# Patient Record
Sex: MALE | Race: WHITE
[De-identification: names, ages, dates, MRNs, and addresses within clinical notes are randomized per-mention and may not be internally consistent; named-entity substitution may affect disease eponyms.]

---

## 2020-05-20 ENCOUNTER — HOSPITAL ENCOUNTER (INPATIENT)
Dept: HOSPITAL 41 - JD.ED | Age: 20
LOS: 4 days | Discharge: HOME | DRG: 139 | End: 2020-05-24
Attending: INTERNAL MEDICINE | Admitting: INTERNAL MEDICINE
Payer: COMMERCIAL

## 2020-05-20 DIAGNOSIS — J18.9: Primary | ICD-10-CM

## 2020-05-20 DIAGNOSIS — F32.9: ICD-10-CM

## 2020-05-20 DIAGNOSIS — Z20.828: ICD-10-CM

## 2020-05-20 DIAGNOSIS — E87.6: ICD-10-CM

## 2020-05-20 DIAGNOSIS — F41.9: ICD-10-CM

## 2020-05-20 DIAGNOSIS — Z87.891: ICD-10-CM

## 2020-05-20 DIAGNOSIS — J40: ICD-10-CM

## 2020-05-20 PROCEDURE — 8E0ZXY6 ISOLATION: ICD-10-PCS | Performed by: INTERNAL MEDICINE

## 2020-05-20 PROCEDURE — U0002 COVID-19 LAB TEST NON-CDC: HCPCS

## 2020-05-20 NOTE — CR
Chest: Portable view of the chest was obtained.

 

Comparison: No previous chest imaging.

 

Mild increased density within the right lung base is seen.  Lungs 

otherwise are clear.  Heart size and mediastinum are normal.  Bony 

structures are unremarkable.

 

Impression:

1.  Mild increased density within the right lung base either due to 

focal bronchitis or early pneumonia.  Etiology could be bacterial or 

viral.

2.  No additional abnormality is seen.

 

Diagnostic code #3

 

This report was dictated in MDT

## 2020-05-20 NOTE — EDM.PDOC
ED HPI GENERAL MEDICAL PROBLEM





- General


Chief Complaint: Respiratory Problem


Stated Complaint: COUGH SOB CHEST PRESSURE VOMITING


Time Seen by Provider: 05/20/20 20:04


Source of Information: Reports: Patient


History Limitations: Reports: No Limitations





- History of Present Illness


INITIAL COMMENTS - FREE TEXT/NARRATIVE: 





Patient is a 20-year-old male who presents to the emergency department with a 

one-week history of fever, chills, body aches, cough, nausea, and vomiting.  He 

states his symptoms initially began last Tuesday as a fever and body aches.  

Approximally 4 days ago he developed a dry cough, nausea, and vomiting.  He was 

seen on Monday at the [facility] walk-in clinic.  He was started on amoxicillin 

and azithromycin for pneumonia.  He was also given Zofran for nausea.  A 

coronavirus screen was collected that time, however he has not received results 

of this.  He states that he has not been able to keep the oral antibiotics down 

as he continues to vomit despite Zofran.  Today he developed shortness of 

breath.  He continues to have intermittent fevers, however he is unsure of his T

-max as he does not have a thermometer at home.  Patient did recently quit 

smoking and states that he was working outside" the wet and cold ".  He came to 

[place] from [place] on 7 May to work at the [employer] in [employer].  He states 

that prior to coming to [place] he was on quarantine as the entire state 

is on lockdown.  Once arriving to my door on the seventh he was on another 4 to 

5 days of quarantine until the executive orders were listed lifted by the 

governor.  His first day of work with a group of individuals was on the 12th 

which was only 1 day prior to him developing symptoms.  He has had no known 

sick contacts.  H denies any underlying health conditions.  


  ** Generalized


Pain Score (Numeric/FACES): 6





- Related Data


 Allergies











Allergy/AdvReac Type Severity Reaction Status Date / Time


 


No Known Allergies Allergy   Verified 05/20/20 23:37











Home Meds: 


 Home Meds





. [No Known Home Meds]  05/21/20 [History]











Past Medical History





- Past Health History


Medical/Surgical History: Denies Medical/Surgical History





Social & Family History





- Tobacco Use


Smoking Status *Q: Former Smoker


Used Tobacco, but Quit: Yes


Month/Year Tobacco Last Used: march 2020





ED ROS GENERAL





- Review of Systems


Review Of Systems: Comprehensive ROS is negative, except as noted in HPI.





ED EXAM, GENERAL





- Physical Exam


Exam: See Below


Exam Limited By: No Limitations


General Appearance: Alert, WD/WN, No Apparent Distress


Respiratory/Chest: No Respiratory Distress, Normal Breath Sounds, No Accessory 

Muscle Use, Chest Non-Tender, Decreased Breath Sounds.  No: Crackles, Rales, 

Rhonchi, Wheezing


Cardiovascular: Normal Peripheral Pulses, Regular Rate, Rhythm, No Edema, No 

Gallop, No JVD, No Murmur, No Rub


Neurological: Alert, Oriented, CN II-XII Intact, Normal Cognition, Normal Gait, 

Normal Reflexes, No Motor/Sensory Deficits


Psychiatric: Normal Affect, Normal Mood


Skin Exam: Warm, Dry, Intact, Normal Color, No Rash





Course





- Vital Signs


Last Recorded V/S: 


 Last Vital Signs











Temp  99.7 F   05/21/20 19:19


 


Pulse  103 H  05/21/20 17:45


 


Resp  24 H  05/21/20 15:15


 


BP  132/72   05/21/20 15:15


 


Pulse Ox  98   05/21/20 20:39














- Orders/Labs/Meds


Orders: 


 Active Orders 24 hr











 Category Date Time Status


 


 Patient Status [ADT] Routine ADT  05/20/20 23:13 Active








 Medication Orders





Acetaminophen (Tylenol)  650 mg PO Q4H PRN


   PRN Reason: Pain/Fever


   Last Admin: 05/21/20 18:08  Dose: 650 mg


   Admin: 05/21/20 08:15  Dose: 650 mg


   Admin: 05/21/20 00:24  Dose: 650 mg


Albuterol (Proventil Hfa)  0 gm INH Q2H PRN


   PRN Reason: Shortness Of Breath/wheezing


   Last Admin: 05/21/20 09:11  Dose: 2 puff


Albuterol (Proventil Hfa)  0 gm INH Q6H ELSY


   Last Admin: 05/21/20 20:35  Dose: 2 puff


   Admin: 05/21/20 14:40  Dose: 2 puff


   Admin: 05/21/20 09:11  Dose:  


Cyclobenzaprine HCl (Flexeril)  10 mg PO TID PRN


   PRN Reason: Muscle Spasm


   Last Admin: 05/21/20 17:43  Dose: 10 mg


Glycopyrrolate (Seebri Neohaler)  15.6 mcg IH BID ELSY


   Last Admin: 05/21/20 20:36  Dose: 1 puff


   Admin: 05/21/20 09:11  Dose: 1 puff


Guaifenesin/Phenylephrine HCl (Robitussin Dm)  10 ml PO TID@0700,1400,2100 PRN


   PRN Reason: Cough


Lactated Ringer's (Ringers, Lactated)  1,000 mls @ 50 mls/hr IV ASDIRECTED ELSY


   Last Admin: 05/21/20 15:15  Dose: 50 mls/hr


Ceftriaxone Sodium 2 gm/ (Sodium Chloride)  100 mls @ 200 mls/hr IV Q24H ELSY


   Last Admin: 05/21/20 08:16  Dose: 200 mls/hr


Azithromycin 500 mg/ Sodium (Chloride)  250 mls @ 250 mls/hr IV Q24H ELSY


   Last Admin: 05/21/20 09:27  Dose: 250 mls/hr


Metoclopramide HCl (Reglan)  5 mg IVPUSH Q6H PRN


   PRN Reason: Nausea


   Last Admin: 05/21/20 18:08  Dose: 5 mg


Miscellaneous Information (Remove Patch)  0 ea TRDERM ONETIME ONE


   Stop: 05/24/20 15:01


Ondansetron HCl (Zofran)  4 mg IVPUSH Q6HR PRN


   PRN Reason: Nausea


   Last Admin: 05/21/20 15:08  Dose: 4 mg


   Admin: 05/21/20 08:15  Dose: 4 mg


   Admin: 05/21/20 00:33  Dose: 4 mg


Sodium Chloride (Saline Flush)  10 ml FLUSH ASDIRECTED PRN


   PRN Reason: Keep Vein Open


   Last Admin: 05/20/20 20:42  Dose: 10 ml








Labs: 


 Laboratory Tests











  05/20/20 05/20/20 05/20/20 Range/Units





  20:35 20:35 20:35 


 


WBC  12.40 H    (4.23-9.07)  K/mm3


 


RBC  4.78    (4.63-6.08)  M/mm3


 


Hgb  13.3 L    (13.7-17.5)  gm/dl


 


Hct  39.7 L    (40.1-51.0)  %


 


MCV  83.1    (79.0-92.2)  fl


 


MCH  27.8    (25.7-32.2)  pg


 


MCHC  33.5    (32.2-35.5)  g/dl


 


RDW Std Deviation  37.4    (35.1-43.9)  fL


 


Plt Count  241    (163-337)  K/mm3


 


MPV  11.6    (9.4-12.3)  fl


 


Neut % (Auto)  92.4 H    (34.0-67.9)  %


 


Lymph % (Auto)  3.7 L    (21.8-53.1)  %


 


Mono % (Auto)  3.1 L    (5.3-12.2)  %


 


Eos % (Auto)  0.3 L    (0.8-7.0)  


 


Baso % (Auto)  0.2    (0.1-1.2)  %


 


Neut # (Auto)  11.45 H    (1.78-5.38)  K/mm3


 


Lymph # (Auto)  0.46 L    (1.32-3.57)  K/mm3


 


Mono # (Auto)  0.39    (0.30-0.82)  K/mm3


 


Eos # (Auto)  0.04    (0.04-0.54)  K/mm3


 


Baso # (Auto)  0.02    (0.01-0.08)  K/mm3


 


Manual Slide Review  Abnormal smear    


 


D-Dimer, Quantitative     (0.19-0.50)  mg/L


 


Puncture Site     


 


ABG pH     (7.35-7.45)  


 


ABG pCO2     (35.0-45.0)  mmHg


 


ABG pO2     (80.0-100.0)  mmHg


 


ABG HCO3     (22.0-26.0)  meq/L


 


ABG O2 Saturation     (96.0-97.0)  %


 


ABG Base Excess     (-2-2.0)  


 


James Test     


 


O2 Delivery Device     


 


FiO2     (21..00)  %


 


Sodium   137   (136-145)  mEq/L


 


Potassium   3.1 L   (3.5-5.1)  mEq/L


 


Chloride   98   ()  mEq/L


 


Carbon Dioxide   29   (21-32)  mEq/L


 


Anion Gap   13.1   (5-15)  


 


BUN   13   (7-18)  mg/dL


 


Creatinine   1.3   (0.7-1.3)  mg/dL


 


Est Cr Clr Drug Dosing   98.04   mL/min


 


Estimated GFR (MDRD)   > 60   (>60)  mL/min


 


BUN/Creatinine Ratio   10.0 L   (14-18)  


 


Glucose   127 H   ()  mg/dL


 


Lactic Acid    1.0  (0.4-2.0)  mmol/L


 


Calcium   9.1   (8.5-10.1)  mg/dL


 


Phosphorus     (2.6-4.7)  mg/dL


 


Magnesium     (1.8-2.4)  mg/dl


 


Ferritin     ()  ng/ml


 


Total Bilirubin   1.0   (0.2-1.0)  mg/dL


 


AST   47 H   (15-37)  U/L


 


ALT   42   (16-63)  U/L


 


Alkaline Phosphatase   72   ()  U/L


 


Lactate Dehydrogenase   456 H   ()  U/L


 


C-Reactive Protein   26.0 H*   (<1.0)  mg/dL


 


NT-Pro-B Natriuret Pep     (0-125)  pg/mL


 


Total Protein   7.3   (6.4-8.2)  g/dl


 


Albumin   2.9 L   (3.4-5.0)  g/dl


 


Globulin   4.4   gm/dL


 


Albumin/Globulin Ratio   0.7 L   (1-2)  


 


Procalcitonin     (<0.10)  ng/mL


 


SARS Virus RNA (PCR)     (NEGATIVE)  














  05/20/20 05/20/20 05/20/20 Range/Units





  20:35 20:35 20:35 


 


WBC     (4.23-9.07)  K/mm3


 


RBC     (4.63-6.08)  M/mm3


 


Hgb     (13.7-17.5)  gm/dl


 


Hct     (40.1-51.0)  %


 


MCV     (79.0-92.2)  fl


 


MCH     (25.7-32.2)  pg


 


MCHC     (32.2-35.5)  g/dl


 


RDW Std Deviation     (35.1-43.9)  fL


 


Plt Count     (163-337)  K/mm3


 


MPV     (9.4-12.3)  fl


 


Neut % (Auto)     (34.0-67.9)  %


 


Lymph % (Auto)     (21.8-53.1)  %


 


Mono % (Auto)     (5.3-12.2)  %


 


Eos % (Auto)     (0.8-7.0)  


 


Baso % (Auto)     (0.1-1.2)  %


 


Neut # (Auto)     (1.78-5.38)  K/mm3


 


Lymph # (Auto)     (1.32-3.57)  K/mm3


 


Mono # (Auto)     (0.30-0.82)  K/mm3


 


Eos # (Auto)     (0.04-0.54)  K/mm3


 


Baso # (Auto)     (0.01-0.08)  K/mm3


 


Manual Slide Review     


 


D-Dimer, Quantitative   0.86 H   (0.19-0.50)  mg/L


 


Puncture Site     


 


ABG pH     (7.35-7.45)  


 


ABG pCO2     (35.0-45.0)  mmHg


 


ABG pO2     (80.0-100.0)  mmHg


 


ABG HCO3     (22.0-26.0)  meq/L


 


ABG O2 Saturation     (96.0-97.0)  %


 


ABG Base Excess     (-2-2.0)  


 


James Test     


 


O2 Delivery Device     


 


FiO2     (21..00)  %


 


Sodium     (136-145)  mEq/L


 


Potassium     (3.5-5.1)  mEq/L


 


Chloride     ()  mEq/L


 


Carbon Dioxide     (21-32)  mEq/L


 


Anion Gap     (5-15)  


 


BUN     (7-18)  mg/dL


 


Creatinine     (0.7-1.3)  mg/dL


 


Est Cr Clr Drug Dosing     mL/min


 


Estimated GFR (MDRD)     (>60)  mL/min


 


BUN/Creatinine Ratio     (14-18)  


 


Glucose     ()  mg/dL


 


Lactic Acid     (0.4-2.0)  mmol/L


 


Calcium     (8.5-10.1)  mg/dL


 


Phosphorus     (2.6-4.7)  mg/dL


 


Magnesium     (1.8-2.4)  mg/dl


 


Ferritin  502 H    ()  ng/ml


 


Total Bilirubin     (0.2-1.0)  mg/dL


 


AST     (15-37)  U/L


 


ALT     (16-63)  U/L


 


Alkaline Phosphatase     ()  U/L


 


Lactate Dehydrogenase     ()  U/L


 


C-Reactive Protein     (<1.0)  mg/dL


 


NT-Pro-B Natriuret Pep     (0-125)  pg/mL


 


Total Protein     (6.4-8.2)  g/dl


 


Albumin     (3.4-5.0)  g/dl


 


Globulin     gm/dL


 


Albumin/Globulin Ratio     (1-2)  


 


Procalcitonin    0.82 H  (<0.10)  ng/mL


 


SARS Virus RNA (PCR)     (NEGATIVE)  














  05/20/20 05/20/20 05/20/20 Range/Units





  20:35 20:40 22:58 


 


WBC     (4.23-9.07)  K/mm3


 


RBC     (4.63-6.08)  M/mm3


 


Hgb     (13.7-17.5)  gm/dl


 


Hct     (40.1-51.0)  %


 


MCV     (79.0-92.2)  fl


 


MCH     (25.7-32.2)  pg


 


MCHC     (32.2-35.5)  g/dl


 


RDW Std Deviation     (35.1-43.9)  fL


 


Plt Count     (163-337)  K/mm3


 


MPV     (9.4-12.3)  fl


 


Neut % (Auto)     (34.0-67.9)  %


 


Lymph % (Auto)     (21.8-53.1)  %


 


Mono % (Auto)     (5.3-12.2)  %


 


Eos % (Auto)     (0.8-7.0)  


 


Baso % (Auto)     (0.1-1.2)  %


 


Neut # (Auto)     (1.78-5.38)  K/mm3


 


Lymph # (Auto)     (1.32-3.57)  K/mm3


 


Mono # (Auto)     (0.30-0.82)  K/mm3


 


Eos # (Auto)     (0.04-0.54)  K/mm3


 


Baso # (Auto)     (0.01-0.08)  K/mm3


 


Manual Slide Review     


 


D-Dimer, Quantitative     (0.19-0.50)  mg/L


 


Puncture Site    Rt radial  


 


ABG pH    7.48 H  (7.35-7.45)  


 


ABG pCO2    38.1  (35.0-45.0)  mmHg


 


ABG pO2    73.0 L  (80.0-100.0)  mmHg


 


ABG HCO3    27.7 H  (22.0-26.0)  meq/L


 


ABG O2 Saturation    96.1  (96.0-97.0)  %


 


ABG Base Excess    4.4 H  (-2-2.0)  


 


James Test    Positive  


 


O2 Delivery Device    Room air  


 


FiO2    0.00 L  (21..00)  %


 


Sodium     (136-145)  mEq/L


 


Potassium     (3.5-5.1)  mEq/L


 


Chloride     ()  mEq/L


 


Carbon Dioxide     (21-32)  mEq/L


 


Anion Gap     (5-15)  


 


BUN     (7-18)  mg/dL


 


Creatinine     (0.7-1.3)  mg/dL


 


Est Cr Clr Drug Dosing     mL/min


 


Estimated GFR (MDRD)     (>60)  mL/min


 


BUN/Creatinine Ratio     (14-18)  


 


Glucose     ()  mg/dL


 


Lactic Acid     (0.4-2.0)  mmol/L


 


Calcium     (8.5-10.1)  mg/dL


 


Phosphorus  2.4 L    (2.6-4.7)  mg/dL


 


Magnesium  1.8    (1.8-2.4)  mg/dl


 


Ferritin     ()  ng/ml


 


Total Bilirubin     (0.2-1.0)  mg/dL


 


AST     (15-37)  U/L


 


ALT     (16-63)  U/L


 


Alkaline Phosphatase     ()  U/L


 


Lactate Dehydrogenase     ()  U/L


 


C-Reactive Protein     (<1.0)  mg/dL


 


NT-Pro-B Natriuret Pep     (0-125)  pg/mL


 


Total Protein     (6.4-8.2)  g/dl


 


Albumin     (3.4-5.0)  g/dl


 


Globulin     gm/dL


 


Albumin/Globulin Ratio     (1-2)  


 


Procalcitonin     (<0.10)  ng/mL


 


SARS Virus RNA (PCR)   Negative   (NEGATIVE)  














  05/20/20 Range/Units





  23:15 


 


WBC   (4.23-9.07)  K/mm3


 


RBC   (4.63-6.08)  M/mm3


 


Hgb   (13.7-17.5)  gm/dl


 


Hct   (40.1-51.0)  %


 


MCV   (79.0-92.2)  fl


 


MCH   (25.7-32.2)  pg


 


MCHC   (32.2-35.5)  g/dl


 


RDW Std Deviation   (35.1-43.9)  fL


 


Plt Count   (163-337)  K/mm3


 


MPV   (9.4-12.3)  fl


 


Neut % (Auto)   (34.0-67.9)  %


 


Lymph % (Auto)   (21.8-53.1)  %


 


Mono % (Auto)   (5.3-12.2)  %


 


Eos % (Auto)   (0.8-7.0)  


 


Baso % (Auto)   (0.1-1.2)  %


 


Neut # (Auto)   (1.78-5.38)  K/mm3


 


Lymph # (Auto)   (1.32-3.57)  K/mm3


 


Mono # (Auto)   (0.30-0.82)  K/mm3


 


Eos # (Auto)   (0.04-0.54)  K/mm3


 


Baso # (Auto)   (0.01-0.08)  K/mm3


 


Manual Slide Review   


 


D-Dimer, Quantitative   (0.19-0.50)  mg/L


 


Puncture Site   


 


ABG pH   (7.35-7.45)  


 


ABG pCO2   (35.0-45.0)  mmHg


 


ABG pO2   (80.0-100.0)  mmHg


 


ABG HCO3   (22.0-26.0)  meq/L


 


ABG O2 Saturation   (96.0-97.0)  %


 


ABG Base Excess   (-2-2.0)  


 


James Test   


 


O2 Delivery Device   


 


FiO2   (21..00)  %


 


Sodium   (136-145)  mEq/L


 


Potassium   (3.5-5.1)  mEq/L


 


Chloride   ()  mEq/L


 


Carbon Dioxide   (21-32)  mEq/L


 


Anion Gap   (5-15)  


 


BUN   (7-18)  mg/dL


 


Creatinine   (0.7-1.3)  mg/dL


 


Est Cr Clr Drug Dosing   mL/min


 


Estimated GFR (MDRD)   (>60)  mL/min


 


BUN/Creatinine Ratio   (14-18)  


 


Glucose   ()  mg/dL


 


Lactic Acid   (0.4-2.0)  mmol/L


 


Calcium   (8.5-10.1)  mg/dL


 


Phosphorus   (2.6-4.7)  mg/dL


 


Magnesium   (1.8-2.4)  mg/dl


 


Ferritin   ()  ng/ml


 


Total Bilirubin   (0.2-1.0)  mg/dL


 


AST   (15-37)  U/L


 


ALT   (16-63)  U/L


 


Alkaline Phosphatase   ()  U/L


 


Lactate Dehydrogenase   ()  U/L


 


C-Reactive Protein   (<1.0)  mg/dL


 


NT-Pro-B Natriuret Pep  73  (0-125)  pg/mL


 


Total Protein   (6.4-8.2)  g/dl


 


Albumin   (3.4-5.0)  g/dl


 


Globulin   gm/dL


 


Albumin/Globulin Ratio   (1-2)  


 


Procalcitonin   (<0.10)  ng/mL


 


SARS Virus RNA (PCR)   (NEGATIVE)  











Meds: 


Medications











Generic Name Dose Route Start Last Admin





  Trade Name Freq  PRN Reason Stop Dose Admin


 


Acetaminophen  650 mg  05/20/20 23:45  05/21/20 18:08





  Tylenol  PO   650 mg





  Q4H PRN   Administration





  Pain/Fever   





     





     





     


 


Albuterol  0 gm  05/21/20 08:35  05/21/20 09:11





  Proventil Hfa  INH   2 puff





  Q2H PRN   Administration





  Shortness Of Breath/wheezing   





     





     





     


 


Albuterol  0 gm  05/21/20 09:00  05/21/20 20:35





  Proventil Hfa  INH   2 puff





  Q6H ELSY   Administration





     





     





     





     


 


Cyclobenzaprine HCl  10 mg  05/21/20 16:41  05/21/20 17:43





  Flexeril  PO   10 mg





  TID PRN   Administration





  Muscle Spasm   





     





     





     


 


Glycopyrrolate  15.6 mcg  05/21/20 09:00  05/21/20 20:36





  Seebri Neohaler  IH   1 puff





  BID ELSY   Administration





     





     





     





     


 


Guaifenesin/Phenylephrine HCl  10 ml  05/21/20 14:00  





  Robitussin Dm  PO   





  TID@0700,1400,2100 PRN   





  Cough   





     





     





     


 


Lactated Ringer's  1,000 mls @ 50 mls/hr  05/20/20 23:45  05/21/20 15:15





  Ringers, Lactated  IV   50 mls/hr





  ASDIRECTED ELSY   Administration





     





     





     





     


 


Ceftriaxone Sodium 2 gm/  100 mls @ 200 mls/hr  05/21/20 08:00  05/21/20 08:16





  Sodium Chloride  IV   200 mls/hr





  Q24H ELSY   Administration





     





     





     





     


 


Azithromycin 500 mg/ Sodium  250 mls @ 250 mls/hr  05/21/20 09:00  05/21/20 09:

27





  Chloride  IV   250 mls/hr





  Q24H ELSY   Administration





     





     





     





     


 


Metoclopramide HCl  5 mg  05/21/20 17:51  05/21/20 18:08





  Reglan  IVPUSH   5 mg





  Q6H PRN   Administration





  Nausea   





     





     





     


 


Miscellaneous Information  0 ea  05/24/20 15:00  





  Remove Patch  TRDERM  05/24/20 15:01  





  ONETIME ONE   





     





     





     





     


 


Ondansetron HCl  4 mg  05/21/20 00:13  05/21/20 15:08





  Zofran  IVPUSH   4 mg





  Q6HR PRN   Administration





  Nausea   





     





     





     


 


Sodium Chloride  10 ml  05/20/20 20:04  05/20/20 20:42





  Saline Flush  FLUSH   10 ml





  ASDIRECTED PRN   Administration





  Keep Vein Open   





     





     





     














Discontinued Medications














Generic Name Dose Route Start Last Admin





  Trade Name Haroldoq  PRN Reason Stop Dose Admin


 


Albuterol  0 gm  05/21/20 08:45  05/21/20 10:01





  Proventil Hfa  INH   Not Given





  Q6H ELSY   





     





     





     





     


 


Albuterol/Ipratropium  3 ml  05/21/20 09:00  





  Duoneb 3.0-0.5 Mg/3 Ml  NEB   





  Q6HRRT ELSY   





     





     





     





     


 


Ceftriaxone Sodium 1 gm/  100 mls @ 200 mls/hr  05/20/20 22:33  05/20/20 22:51





  Sodium Chloride  IV  05/20/20 23:02  200 mls/hr





  ONETIME ONE   Administration





     





     





     





     


 


Lactated Ringer's  1,000 mls @ 100 mls/hr  05/20/20 22:45  05/20/20 22:52





  Ringers, Lactated  IV   100 mls/hr





  ASDIRECTED ELSY   Administration





     





     





     





     


 


Potassium Chloride 10 meq/  100 mls @ 100 mls/hr  05/20/20 22:45  05/20/20 22:52





  Premix  IV  05/23/20 23:44  100 mls/hr





  ASDIRECTED ELSY   Administration





     





     





     





     


 


Potassium Chloride 10 meq/  100 mls @ 100 mls/hr  05/20/20 23:45  05/21/20 02:48





  Premix  IV  05/21/20 02:44  100 mls/hr





  Q1H ELSY   Administration





     





     





     





     


 


Scopolamine  1.5 mg  05/21/20 15:00  05/21/20 15:08





  Transderm-Scop  TOP  05/21/20 15:01  1.5 mg





  ONETIME ONE   Administration





     





     





     





     














- Re-Assessments/Exams


Free Text/Narrative Re-Assessment/Exam: 





05/20/20 23:08


Hematology was significant for a white count slightly elevated at 12.4, 

hemoglobin slightly low at 13.3, d-dimer elevated at 0.86, potassium low at 3.1

, ferritin high at 502, AST slightly elevated at 47, LDH high at 456, CRP 

elevated at 26.  Results of his labs are concerning for COVID, however the 

coronavirus test was found to be negative.  Chest x-ray shows mild increased 

density within the right lung base either due to focal bronchitis or early 

pneumonia.  Etiology could be bacterial or viral.  Patient has been on 2 L of 

O2 since his arrival to the ER satting 94 to 96%.  Vital signs were otherwise 

stable.  I have ordered infusion of lactated Ringer's at 100 mils per hour, 

Rocephin 1 g IV, and KCl 10 mEq x 4 bags.  Called and spoke with hospitalist, 

Dr. Cano.  She requested we add magnesium, phosphorus, and proBNP.  She 

gave verbal bridge orders for admission as an inpatient for pneumonia.











Departure





- Departure


Time of Disposition: 23:08


Disposition: Admitted As Inpatient 65


Condition: Good


Clinical Impression: 


Pneumonia


Qualifiers:


 Pneumonia type: due to unspecified organism Laterality: right Lung location: 

lower lobe of lung Qualified Code(s): J18.9 - Pneumonia, unspecified organism








- Discharge Information





Sepsis Event Note





- Evaluation


Sepsis Screening Result: Possible Sepsis Risk





- Focused Exam


Date Exam was Performed: 05/21/20


Time Exam was Performed: 22:03





- My Orders


Last 24 Hours: 


My Active Orders





05/20/20 23:13


Patient Status [ADT] Routine 














- Assessment/Plan


Last 24 Hours: 


My Active Orders





05/20/20 23:13


Patient Status [ADT] Routine

## 2020-05-21 RX ADMIN — GLYCOPYRROLATE SCH PUFF: 15.6 CAPSULE RESPIRATORY (INHALATION) at 20:36

## 2020-05-21 RX ADMIN — SODIUM CHLORIDE PRN MG: 9 INJECTION, SOLUTION INTRAVENOUS at 08:15

## 2020-05-21 RX ADMIN — SODIUM CHLORIDE PRN MG: 9 INJECTION, SOLUTION INTRAVENOUS at 00:33

## 2020-05-21 RX ADMIN — GLYCOPYRROLATE SCH PUFF: 15.6 CAPSULE RESPIRATORY (INHALATION) at 09:11

## 2020-05-21 RX ADMIN — SODIUM CHLORIDE PRN MG: 9 INJECTION, SOLUTION INTRAVENOUS at 15:08

## 2020-05-21 NOTE — PCM.HP.2
H&P History of Present Illness





- General


Date of Service: 05/21/20


Admit Problem/Dx: 


 Admission Diagnosis/Problem





Admission Diagnosis/Problem      Pneumonia








Source of Information: Patient, Old Records, Provider, RN, RN Notes Reviewed


History Limitations: Reports: No Limitations





- History of Present Illness


Initial Comments - Free Text/Narative: 


Juan Luis Stroud is a 19 yo male who presents to ED on 5/20/2020 with respiratory 

symptoms of cough, shortness of breath, chest pressure, and vomiting.  He 

reports fever, chills, body aches, cough, nausea, and vomiting for the past 

week.  Symptoms started on Tuesday, 9/12/2020 with fever and body aches.  These 

progressed to dry cough, nausea, and vomiting around 5/16/2020.  He presented 

to the [place] walk-in on Monday and was started on amoxicillin and 

azithromycin for pneumonia.  He was also given Zofran for nausea and a 

coronavirus screen was obtained.  States that he continues to vomit despite 

Zofran and has been unable to keep any of his antibiotics down.  He states 

prior to coming to the emergency room he developed shortness of breath.  He has 

had intermittent fevers but he is unsure how high they have been as he does not 

have a thermometer at home.  He states he recently quit smoking. He came to 

[place] from [place] on May 7 to work at the [employer].  He reports 

prior to coming in [place] he was quarantined due to the COVID 19 

lockdown.  He reports he was quarantined for another for 5 days once he arrived 

in [place].  He reports his first day of work was the 12th which was 1 day 

prior to developing symptoms.  Denies any prior sick contacts or underlying 

health conditions.





In the ED had a temp of 100.4 and a pulse of 106.  White count was elevated 

along with CRP.  Ferritin was elevated and d-dimer was mildly elevated.  COVID-

19 screen was obtained and was negative.  His potassium was also noted to be 

low.  He was hypoxic and placed on 2 L of oxygen with saturations of 94 to 96%.

  Ex x-rays obtained and shows a mild increased density within the right lung 

base either due to focal bronchitis or early pneumonia.  Etiology could be 

either bacterial or viral.





He does not have a primary care provider locally.  He was subsequently admitted 

to the medical floor for management of his failed outpatient pneumonia and 

hypoxia. He is a former smoker who quit in March of 2020. He states he vaped 

for about a year prior to quiting smoking as he was attempting to wean down. He 

states he also smoked marijuana. 





  ** Generalized


Pain Score (Numeric/FACES): 6





- Related Data


Allergies/Adverse Reactions: 


 Allergies











Allergy/AdvReac Type Severity Reaction Status Date / Time


 


No Known Allergies Allergy   Verified 05/20/20 23:37











Home Medications: 


 Home Meds





. [No Known Home Meds]  05/21/20 [History]











Past Medical History





- Past Health History


Medical/Surgical History: Denies Medical/Surgical History


HEENT History: Reports: None


Musculoskeletal History: Reports: Other (See Below)


Other Musculoskeletal History: football injuries to back cracked L4, but no 

surgeries.  reports chronic backpain however.


Neurological History: Reports: Concussion, Migraines


Psychiatric History: Reports: Anxiety, Depression, Panic Attack, Suicide Attempt

, Other (See Below)


Other Psychiatric History: reports general and social anxiety.  States his 

suicide attemt was once over 10 years ago and denies any current thoughts of 

harming self.


Endocrine/Metabolic History: Reports: Other (See Below)


Other Endocrine/Metabolic History: states he was told he was pre-diabetic so 

had checked his blood sugars at home for awhile but says they were always fine 

so he stopped.





- Infectious Disease History


Infectious Disease History: Reports: Influenza





- Past Surgical History


HEENT Surgical History: Reports: None


Endocrine Surgical History: Reports: None


Neurological Surgical History: Reports: Other (See Below)


Other Neurological Surgeries/Procedures: cracked L4 several years ago  but no 

surgeries.  now has some chronic backpain.


Musculoskeletal Surgical History: Reports: None





Social & Family History





- Tobacco Use


Smoking Status *Q: Former Smoker


Years of Tobacco use: 5


Packs/Tins Daily: 1


Used Tobacco, but Quit: Yes


Month/Year Tobacco Last Used: 5/7/2020


Second Hand Smoke Exposure: No





- Caffeine Use


Caffeine Use: Reports: None





- Recreational Drug Use


Recreational Drug Use: Yes


Drug Use in Last 12 Months: Yes


Recreational Drug Type: Reports: Marijuana/Hashish


Recreational Drug Use Frequency: Weekly





H&P Review of Systems





- Review of Systems:


Review Of Systems: See Below


General: Reports: Fever, Chills, Malaise, Weakness, Fatigue


HEENT: Reports: No Symptoms.  Denies: Headaches, Sore Throat


Pulmonary: Reports: Shortness of Breath, Cough, Sputum.  Denies: Wheezing, 

Pleuritic Chest Pain


Cardiovascular: Reports: Dyspnea on Exertion.  Denies: Chest Pain, Palpitations

, Edema


Gastrointestinal: Reports: Diarrhea, Nausea.  Denies: Abdominal Pain, 

Constipation, Vomiting (none since in hospital )


Genitourinary: Reports: No Symptoms.  Denies: Pain


Musculoskeletal: Reports: No Symptoms


Skin: Reports: No Symptoms.  Denies: Cyanosis


Psychiatric: Reports: No Symptoms.  Denies: Confusion


Neurological: Reports: No Symptoms.  Denies: Difficulty Walking, Gait 

Disturbance


Hematologic/Lymphatic: Reports: No Symptoms


Immunologic: Reports: No Symptoms





Exam





- Exam


Exam: See Below





- Vital Signs


Vital Signs: 


 Last Vital Signs











Temp  99.7 F   05/21/20 01:27


 


Pulse  88   05/20/20 23:35


 


Resp  18   05/20/20 23:30


 


BP  125/63   05/20/20 23:30


 


Pulse Ox  94 L  05/20/20 23:35











Weight: 174 lb 3.2 oz





- Exam


Quality Assessment: Supplemental Oxygen (2L)


General: Alert, Oriented, Cooperative.  No: Mild Distress


HEENT: Conjunctiva Clear, EOMI, Mucosa Moist & Pink, Posterior Pharynx Clear, 

PERRLA


Lungs: Normal Respiratory Effort, Decreased Breath Sounds, Crackles.  No: 

Rhonchi, Wheezing


Cardiovascular: Regular Rhythm, Tachycardia


GI/Abdominal Exam: Normal Bowel Sounds, Soft, Non-Tender, No Distention


 (Male) Exam: Deferred


Rectal (Males) Exam: Deferred


Back Exam: Normal Inspection, Full Range of Motion


Extremities: Normal Inspection, Normal Range of Motion, Non-Tender, No Pedal 

Edema, Normal Capillary Refill


Peripheral Pulses: 4+: Radial (L), Radial (R), Dorsalis Pedis (L), Dorsalis 

Pedis (R)


Skin: Warm, Dry, Intact


Neurological: Cranial Nerves Intact (Grossly )


Neuro Extensive - Mental Status: Alert, Oriented x3, Normal Mood/Affect





- Patient Data


Lab Results Last 24 hrs: 


 Laboratory Results - last 24 hr











  05/20/20 05/20/20 05/20/20 Range/Units





  20:35 20:35 20:35 


 


WBC  12.40 H    (4.23-9.07)  K/mm3


 


RBC  4.78    (4.63-6.08)  M/mm3


 


Hgb  13.3 L    (13.7-17.5)  gm/dl


 


Hct  39.7 L    (40.1-51.0)  %


 


MCV  83.1    (79.0-92.2)  fl


 


MCH  27.8    (25.7-32.2)  pg


 


MCHC  33.5    (32.2-35.5)  g/dl


 


RDW Std Deviation  37.4    (35.1-43.9)  fL


 


Plt Count  241    (163-337)  K/mm3


 


MPV  11.6    (9.4-12.3)  fl


 


Neut % (Auto)  92.4 H    (34.0-67.9)  %


 


Lymph % (Auto)  3.7 L    (21.8-53.1)  %


 


Mono % (Auto)  3.1 L    (5.3-12.2)  %


 


Eos % (Auto)  0.3 L    (0.8-7.0)  


 


Baso % (Auto)  0.2    (0.1-1.2)  %


 


Neut # (Auto)  11.45 H    (1.78-5.38)  K/mm3


 


Lymph # (Auto)  0.46 L    (1.32-3.57)  K/mm3


 


Mono # (Auto)  0.39    (0.30-0.82)  K/mm3


 


Eos # (Auto)  0.04    (0.04-0.54)  K/mm3


 


Baso # (Auto)  0.02    (0.01-0.08)  K/mm3


 


Manual Slide Review  Abnormal smear    


 


D-Dimer, Quantitative     (0.19-0.50)  mg/L


 


Puncture Site     


 


ABG pH     (7.35-7.45)  


 


ABG pCO2     (35.0-45.0)  mmHg


 


ABG pO2     (80.0-100.0)  mmHg


 


ABG HCO3     (22.0-26.0)  meq/L


 


ABG O2 Saturation     (96.0-97.0)  %


 


ABG Base Excess     (-2-2.0)  


 


James Test     


 


O2 Delivery Device     


 


FiO2     (21..00)  %


 


Sodium   137   (136-145)  mEq/L


 


Potassium   3.1 L   (3.5-5.1)  mEq/L


 


Chloride   98   ()  mEq/L


 


Carbon Dioxide   29   (21-32)  mEq/L


 


Anion Gap   13.1   (5-15)  


 


BUN   13   (7-18)  mg/dL


 


Creatinine   1.3   (0.7-1.3)  mg/dL


 


Est Cr Clr Drug Dosing   98.04   mL/min


 


Estimated GFR (MDRD)   > 60   (>60)  mL/min


 


BUN/Creatinine Ratio   10.0 L   (14-18)  


 


Glucose   127 H   ()  mg/dL


 


Lactic Acid    1.0  (0.4-2.0)  mmol/L


 


Calcium   9.1   (8.5-10.1)  mg/dL


 


Phosphorus     (2.6-4.7)  mg/dL


 


Magnesium     (1.8-2.4)  mg/dl


 


Ferritin     ()  ng/ml


 


Total Bilirubin   1.0   (0.2-1.0)  mg/dL


 


AST   47 H   (15-37)  U/L


 


ALT   42   (16-63)  U/L


 


Alkaline Phosphatase   72   ()  U/L


 


Lactate Dehydrogenase   456 H   ()  U/L


 


C-Reactive Protein   26.0 H*   (<1.0)  mg/dL


 


NT-Pro-B Natriuret Pep     (0-125)  pg/mL


 


Total Protein   7.3   (6.4-8.2)  g/dl


 


Albumin   2.9 L   (3.4-5.0)  g/dl


 


Globulin   4.4   gm/dL


 


Albumin/Globulin Ratio   0.7 L   (1-2)  


 


SARS Virus RNA (PCR)     (NEGATIVE)  














  05/20/20 05/20/20 05/20/20 Range/Units





  20:35 20:35 20:35 


 


WBC     (4.23-9.07)  K/mm3


 


RBC     (4.63-6.08)  M/mm3


 


Hgb     (13.7-17.5)  gm/dl


 


Hct     (40.1-51.0)  %


 


MCV     (79.0-92.2)  fl


 


MCH     (25.7-32.2)  pg


 


MCHC     (32.2-35.5)  g/dl


 


RDW Std Deviation     (35.1-43.9)  fL


 


Plt Count     (163-337)  K/mm3


 


MPV     (9.4-12.3)  fl


 


Neut % (Auto)     (34.0-67.9)  %


 


Lymph % (Auto)     (21.8-53.1)  %


 


Mono % (Auto)     (5.3-12.2)  %


 


Eos % (Auto)     (0.8-7.0)  


 


Baso % (Auto)     (0.1-1.2)  %


 


Neut # (Auto)     (1.78-5.38)  K/mm3


 


Lymph # (Auto)     (1.32-3.57)  K/mm3


 


Mono # (Auto)     (0.30-0.82)  K/mm3


 


Eos # (Auto)     (0.04-0.54)  K/mm3


 


Baso # (Auto)     (0.01-0.08)  K/mm3


 


Manual Slide Review     


 


D-Dimer, Quantitative   0.86 H   (0.19-0.50)  mg/L


 


Puncture Site     


 


ABG pH     (7.35-7.45)  


 


ABG pCO2     (35.0-45.0)  mmHg


 


ABG pO2     (80.0-100.0)  mmHg


 


ABG HCO3     (22.0-26.0)  meq/L


 


ABG O2 Saturation     (96.0-97.0)  %


 


ABG Base Excess     (-2-2.0)  


 


James Test     


 


O2 Delivery Device     


 


FiO2     (21..00)  %


 


Sodium     (136-145)  mEq/L


 


Potassium     (3.5-5.1)  mEq/L


 


Chloride     ()  mEq/L


 


Carbon Dioxide     (21-32)  mEq/L


 


Anion Gap     (5-15)  


 


BUN     (7-18)  mg/dL


 


Creatinine     (0.7-1.3)  mg/dL


 


Est Cr Clr Drug Dosing     mL/min


 


Estimated GFR (MDRD)     (>60)  mL/min


 


BUN/Creatinine Ratio     (14-18)  


 


Glucose     ()  mg/dL


 


Lactic Acid     (0.4-2.0)  mmol/L


 


Calcium     (8.5-10.1)  mg/dL


 


Phosphorus    2.4 L  (2.6-4.7)  mg/dL


 


Magnesium    1.8  (1.8-2.4)  mg/dl


 


Ferritin  502 H    ()  ng/ml


 


Total Bilirubin     (0.2-1.0)  mg/dL


 


AST     (15-37)  U/L


 


ALT     (16-63)  U/L


 


Alkaline Phosphatase     ()  U/L


 


Lactate Dehydrogenase     ()  U/L


 


C-Reactive Protein     (<1.0)  mg/dL


 


NT-Pro-B Natriuret Pep     (0-125)  pg/mL


 


Total Protein     (6.4-8.2)  g/dl


 


Albumin     (3.4-5.0)  g/dl


 


Globulin     gm/dL


 


Albumin/Globulin Ratio     (1-2)  


 


SARS Virus RNA (PCR)     (NEGATIVE)  














  05/20/20 05/20/20 05/20/20 Range/Units





  20:40 22:58 23:15 


 


WBC     (4.23-9.07)  K/mm3


 


RBC     (4.63-6.08)  M/mm3


 


Hgb     (13.7-17.5)  gm/dl


 


Hct     (40.1-51.0)  %


 


MCV     (79.0-92.2)  fl


 


MCH     (25.7-32.2)  pg


 


MCHC     (32.2-35.5)  g/dl


 


RDW Std Deviation     (35.1-43.9)  fL


 


Plt Count     (163-337)  K/mm3


 


MPV     (9.4-12.3)  fl


 


Neut % (Auto)     (34.0-67.9)  %


 


Lymph % (Auto)     (21.8-53.1)  %


 


Mono % (Auto)     (5.3-12.2)  %


 


Eos % (Auto)     (0.8-7.0)  


 


Baso % (Auto)     (0.1-1.2)  %


 


Neut # (Auto)     (1.78-5.38)  K/mm3


 


Lymph # (Auto)     (1.32-3.57)  K/mm3


 


Mono # (Auto)     (0.30-0.82)  K/mm3


 


Eos # (Auto)     (0.04-0.54)  K/mm3


 


Baso # (Auto)     (0.01-0.08)  K/mm3


 


Manual Slide Review     


 


D-Dimer, Quantitative     (0.19-0.50)  mg/L


 


Puncture Site   Rt radial   


 


ABG pH   7.48 H   (7.35-7.45)  


 


ABG pCO2   38.1   (35.0-45.0)  mmHg


 


ABG pO2   73.0 L   (80.0-100.0)  mmHg


 


ABG HCO3   27.7 H   (22.0-26.0)  meq/L


 


ABG O2 Saturation   96.1   (96.0-97.0)  %


 


ABG Base Excess   4.4 H   (-2-2.0)  


 


James Test   Positive   


 


O2 Delivery Device   Room air   


 


FiO2   0.00 L   (21..00)  %


 


Sodium     (136-145)  mEq/L


 


Potassium     (3.5-5.1)  mEq/L


 


Chloride     ()  mEq/L


 


Carbon Dioxide     (21-32)  mEq/L


 


Anion Gap     (5-15)  


 


BUN     (7-18)  mg/dL


 


Creatinine     (0.7-1.3)  mg/dL


 


Est Cr Clr Drug Dosing     mL/min


 


Estimated GFR (MDRD)     (>60)  mL/min


 


BUN/Creatinine Ratio     (14-18)  


 


Glucose     ()  mg/dL


 


Lactic Acid     (0.4-2.0)  mmol/L


 


Calcium     (8.5-10.1)  mg/dL


 


Phosphorus     (2.6-4.7)  mg/dL


 


Magnesium     (1.8-2.4)  mg/dl


 


Ferritin     ()  ng/ml


 


Total Bilirubin     (0.2-1.0)  mg/dL


 


AST     (15-37)  U/L


 


ALT     (16-63)  U/L


 


Alkaline Phosphatase     ()  U/L


 


Lactate Dehydrogenase     ()  U/L


 


C-Reactive Protein     (<1.0)  mg/dL


 


NT-Pro-B Natriuret Pep    73  (0-125)  pg/mL


 


Total Protein     (6.4-8.2)  g/dl


 


Albumin     (3.4-5.0)  g/dl


 


Globulin     gm/dL


 


Albumin/Globulin Ratio     (1-2)  


 


SARS Virus RNA (PCR)  Negative    (NEGATIVE)  














  05/21/20 05/21/20 05/21/20 Range/Units





  05:09 05:09 05:09 


 


WBC  9.68 H    (4.23-9.07)  K/mm3


 


RBC  4.70    (4.63-6.08)  M/mm3


 


Hgb  13.2 L    (13.7-17.5)  gm/dl


 


Hct  39.5 L    (40.1-51.0)  %


 


MCV  84.0    (79.0-92.2)  fl


 


MCH  28.1    (25.7-32.2)  pg


 


MCHC  33.4    (32.2-35.5)  g/dl


 


RDW Std Deviation  37.6    (35.1-43.9)  fL


 


Plt Count  223    (163-337)  K/mm3


 


MPV  11.4    (9.4-12.3)  fl


 


Neut % (Auto)  87.0 H    (34.0-67.9)  %


 


Lymph % (Auto)  9.3 L    (21.8-53.1)  %


 


Mono % (Auto)  2.1 L    (5.3-12.2)  %


 


Eos % (Auto)  1.1    (0.8-7.0)  


 


Baso % (Auto)  0.1    (0.1-1.2)  %


 


Neut # (Auto)  8.42 H    (1.78-5.38)  K/mm3


 


Lymph # (Auto)  0.90 L    (1.32-3.57)  K/mm3


 


Mono # (Auto)  0.20 L    (0.30-0.82)  K/mm3


 


Eos # (Auto)  0.11    (0.04-0.54)  K/mm3


 


Baso # (Auto)  0.01    (0.01-0.08)  K/mm3


 


Manual Slide Review  Abnormal smear    


 


D-Dimer, Quantitative    0.72 H  (0.19-0.50)  mg/L


 


Puncture Site     


 


ABG pH     (7.35-7.45)  


 


ABG pCO2     (35.0-45.0)  mmHg


 


ABG pO2     (80.0-100.0)  mmHg


 


ABG HCO3     (22.0-26.0)  meq/L


 


ABG O2 Saturation     (96.0-97.0)  %


 


ABG Base Excess     (-2-2.0)  


 


James Test     


 


O2 Delivery Device     


 


FiO2     (21..00)  %


 


Sodium   138   (136-145)  mEq/L


 


Potassium   3.9   (3.5-5.1)  mEq/L


 


Chloride   100   ()  mEq/L


 


Carbon Dioxide   30   (21-32)  mEq/L


 


Anion Gap   11.9   (5-15)  


 


BUN   14   (7-18)  mg/dL


 


Creatinine   1.2   (0.7-1.3)  mg/dL


 


Est Cr Clr Drug Dosing   108.83   mL/min


 


Estimated GFR (MDRD)   > 60   (>60)  mL/min


 


BUN/Creatinine Ratio   11.7 L   (14-18)  


 


Glucose   103   ()  mg/dL


 


Lactic Acid     (0.4-2.0)  mmol/L


 


Calcium   9.1   (8.5-10.1)  mg/dL


 


Phosphorus   3.5   (2.6-4.7)  mg/dL


 


Magnesium   2.1   (1.8-2.4)  mg/dl


 


Ferritin     ()  ng/ml


 


Total Bilirubin   0.9   (0.2-1.0)  mg/dL


 


AST   41 H   (15-37)  U/L


 


ALT   43   (16-63)  U/L


 


Alkaline Phosphatase   67   ()  U/L


 


Lactate Dehydrogenase     ()  U/L


 


C-Reactive Protein     (<1.0)  mg/dL


 


NT-Pro-B Natriuret Pep     (0-125)  pg/mL


 


Total Protein   6.8   (6.4-8.2)  g/dl


 


Albumin   2.5 L   (3.4-5.0)  g/dl


 


Globulin   4.3   gm/dL


 


Albumin/Globulin Ratio   0.6 L   (1-2)  


 


SARS Virus RNA (PCR)     (NEGATIVE)  











Result Diagrams: 


 05/21/20 05:09





 05/21/20 05:09





Sepsis Event Note





- Evaluation


Sepsis Screening Result: Possible Sepsis Risk





- Focused Exam


Vital Signs: 


 Vital Signs











  Temp Temp Pulse Pulse Resp BP BP


 


 05/21/20 01:27  99.7 F      


 


 05/21/20 00:24  100.2 F      


 


 05/20/20 23:35    88    


 


 05/20/20 23:30  100.2 F     18  125/63 


 


 05/20/20 19:56   100.4 F   106 H  20   129/76














  Pulse Ox


 


 05/21/20 01:27 


 


 05/21/20 00:24 


 


 05/20/20 23:35  94 L


 


 05/20/20 23:30 


 


 05/20/20 19:56  86 L











Date Exam was Performed: 05/21/20


Time Exam was Performed: 14:52





- Problem List


(1) Former smoker


SNOMED Code(s): 5007499


   ICD Code: Z87.891 - PERSONAL HISTORY OF NICOTINE DEPENDENCE   Status: 

Chronic   Priority: Medium   Current Visit: Yes   





(2) Fever


SNOMED Code(s): 929323165


   ICD Code: R50.9 - FEVER, UNSPECIFIED   Status: Acute   Priority: High   

Current Visit: Yes   


Qualifiers: 


   Fever type: due to other condition   Qualified Code(s): R50.81 - Fever 

presenting with conditions classified elsewhere   





(3) Nausea & vomiting


SNOMED Code(s): 35926854


   ICD Code: R11.2 - NAUSEA WITH VOMITING, UNSPECIFIED   Status: Acute   

Priority: High   Current Visit: Yes   


Qualifiers: 


   Vomiting type: unspecified   Vomiting Intractability: unspecified   

Qualified Code(s): R11.2 - Nausea with vomiting, unspecified   





(4) Hypokalemia


SNOMED Code(s): 35995857


   ICD Code: E87.6 - HYPOKALEMIA   Status: Acute   Priority: High   Current 

Visit: Yes   





(5) Pneumonia


SNOMED Code(s): 037583051


   ICD Code: J18.9 - PNEUMONIA, UNSPECIFIED ORGANISM   Status: Acute   Priority

: High   Current Visit: Yes   


Qualifiers: 


   Pneumonia type: due to unspecified organism   Laterality: right   Lung 

location: lower lobe of lung   Qualified Code(s): J18.9 - Pneumonia, 

unspecified organism   





(6) Hypoxia


SNOMED Code(s): 923108370


   ICD Code: R09.02 - HYPOXEMIA   Status: Acute   Priority: High   Current Visit

: Yes   





(7) History of recent travel


SNOMED Code(s): 009482735


   ICD Code: Z78.9 - OTHER SPECIFIED HEALTH STATUS   Status: Acute   Priority: 

High   Current Visit: Yes   


Problem List Initiated/Reviewed/Updated: Yes


Orders Last 24hrs: 


 Active Orders 24 hr











 Category Date Time Status


 


 Patient Status [ADT] Routine ADT  05/20/20 23:13 Active


 


 ABG [RT Arterial Blood Gases, ABG] [RC] Click to Edit Care  05/21/20 06:00 

Inactive


 


 Activity as Tolerated [RC] .Routine Care  05/20/20 23:37 Active


 


 Arterial Blood Gas [RT Arterial Blood Gases, ABG] [RC] Care  05/20/20 22:48 

Active





 Click to Edit   


 


 Height and Weight [RC] DAILY Care  05/21/20 07:32 Ordered


 


 Intake and Output [RC] QSHIFT Care  05/21/20 07:32 Ordered


 


 Oxygen Therapy Adult [Oxygen Therapy] [RC] ASDIRECTED Care  05/21/20 02:43 

Active


 


 Pulse Oximetry [RC] PRN Care  05/21/20 07:32 Ordered


 


 RT Aerosol Therapy [RC] ASDIRECTED Care  05/21/20 07:30 Ordered


 


 RT Incentive Spirometry [RC] ASDIRECTED Care  05/21/20 07:30 Ordered


 


 Up ad Denae [RC] ASDIRECTED Care  05/21/20 07:32 Ordered


 


 VTE/DVT Education [RC] PER UNIT ROUTINE Care  05/21/20 07:32 Ordered


 


 Vital Signs [RC] Q4H Care  05/21/20 07:32 Ordered


 


 Respiratory Care Assess and Treatment [CONS] Routine Cons  05/21/20 07:32 

Ordered


 


 Regular Diet [DIET] Diet  05/21/20 Breakfast Active


 


 CXR [Chest 2V] [CR] Routine Exams  05/21/20 07:36 Ordered


 


 Chest 1V Frontal [CR] Routine Exams  05/21/20 06:00 Stop Req


 


 ABG [BLOOD GAS ARTERIAL] [BG] Urgent Lab  05/21/20 07:31 Ordered


 


 BASIC METABOLIC PANEL,BMP [CHEM] AM Lab  05/22/20 05:11 Ordered


 


 BASIC METABOLIC PANEL,BMP [CHEM] AM Lab  05/23/20 05:11 Ordered


 


 BASIC METABOLIC PANEL,BMP [CHEM] AM Lab  05/24/20 05:11 Ordered


 


 BASIC METABOLIC PANEL,BMP [CHEM] AM Lab  05/25/20 05:11 Ordered


 


 CBC WITH AUTO DIFF [HEME] AM Lab  05/22/20 05:11 Ordered


 


 CBC WITH AUTO DIFF [HEME] AM Lab  05/23/20 05:11 Ordered


 


 CBC WITH AUTO DIFF [HEME] AM Lab  05/24/20 05:11 Ordered


 


 CBC WITH AUTO DIFF [HEME] AM Lab  05/25/20 05:11 Ordered


 


 CRP [C-REACTIVE PROTEIN] [CHEM] AM Lab  05/22/20 05:11 Ordered


 


 CRP [C-REACTIVE PROTEIN] [CHEM] AM Lab  05/23/20 05:11 Ordered


 


 CRP [C-REACTIVE PROTEIN] [CHEM] AM Lab  05/24/20 05:11 Ordered


 


 CRP [C-REACTIVE PROTEIN] [CHEM] AM Lab  05/25/20 05:11 Ordered


 


 CULTURE BLOOD [BC] Stat Lab  05/20/20 20:35 Received


 


 CULTURE BLOOD [BC] Stat Lab  05/20/20 20:45 Received


 


 CULTURE SPUTUM + SMEAR [RM] Routine Lab  05/21/20 07:29 Ordered


 


 MAGNESIUM [CHEM] AM Lab  05/22/20 05:11 Ordered


 


 MAGNESIUM [CHEM] AM Lab  05/23/20 05:11 Ordered


 


 MAGNESIUM [CHEM] AM Lab  05/24/20 05:11 Ordered


 


 MAGNESIUM [CHEM] AM Lab  05/25/20 05:11 Ordered


 


 PRO B-TYPE NATRIUR PEPT,BNPPRO [CHEM] Routine Lab  05/21/20 05:09 Received


 


 PROCALCITONIN [REF] Stat Lab  05/20/20 20:35 Received


 


 Acetaminophen [Tylenol] Med  05/20/20 23:45 Active





 650 mg PO Q4H PRN   


 


 Albuterol/Ipratropium [DuoNeb 3.0-0.5 MG/3 ML] Med  05/21/20 09:00 Ordered





 3 ml NEB Q6HRRT   


 


 Azithromycin [Zithromax] 500 mg Med  05/21/20 07:45 Ordered





 Sodium Chloride 0.9% [Normal Saline] 250 ml   





 IV Q24H   


 


 Dextromethorphan/guaiFENesin [Robitussin DM] Med  05/21/20 14:00 Ordered





 10 ml PO TID@0700,1400,2100 PRN   


 


 Lactated Ringers [Ringers, Lactated] 1,000 ml Med  05/20/20 23:45 Active





 IV ASDIRECTED   


 


 Ondansetron [Zofran] Med  05/21/20 00:13 Active





 4 mg IVPUSH Q6HR PRN   


 


 Sodium Chloride 0.9% [Saline Flush] Med  05/20/20 20:04 Active





 10 ml FLUSH ASDIRECTED PRN   


 


 cefTRIAXone [Rocephin] 2 gm Med  05/21/20 07:45 Ordered





 Sodium Chloride 0.9% [Normal Saline] 100 ml   





 IV Q24H   


 


 Blood Culture x2 Reflex Set [OM.PC] Stat Ot  05/20/20 20:20 Ordered


 


 Isolation [COMM] Routine Oth  05/21/20 00:30 Ordered


 


 Peripheral IV Insertion Adult [OM.PC] Stat Ot  05/20/20 20:04 Ordered


 


 RT Acapella [RESPCARE] Routine Oth  05/21/20 07:30 Ordered


 


 Resuscitation Status Routine Resus Stat  05/20/20 23:36 Ordered








 Medication Orders





Acetaminophen (Tylenol)  650 mg PO Q4H PRN


   PRN Reason: Pain/Fever


   Last Admin: 05/21/20 00:24  Dose: 650 mg


Albuterol/Ipratropium (Duoneb 3.0-0.5 Mg/3 Ml)  3 ml NEB Q6HRRT ELSY


Guaifenesin/Phenylephrine HCl (Robitussin Dm)  10 ml PO TID@0700,1400,2100 PRN


   PRN Reason: Cough


Lactated Ringer's (Ringers, Lactated)  1,000 mls @ 50 mls/hr IV ASDIRECTED ELSY


Ceftriaxone Sodium 2 gm/ (Sodium Chloride)  100 mls @ 200 mls/hr IV Q24H ELSY


Azithromycin 500 mg/ Sodium (Chloride)  250 mls @ 250 mls/hr IV Q24H ELSY


Ondansetron HCl (Zofran)  4 mg IVPUSH Q6HR PRN


   PRN Reason: Nausea


   Last Admin: 05/21/20 00:33  Dose: 4 mg


Sodium Chloride (Saline Flush)  10 ml FLUSH ASDIRECTED PRN


   PRN Reason: Keep Vein Open


   Last Admin: 05/20/20 20:42  Dose: 10 ml








Assessment/Plan Comment:: 


Pneumonia


Hypoxia


Fever   


Nausea & vomiting


Former smoker


History of recent travel


Reports one week history of fever, chills, body aches, cough, nausea, and 

vomiting


Seen at [facility] on 5/11/20 and prescribed amoxicillin, azithromycin, and 

zofran. Unable to keep down due to vomiting.


COVID-19 screen obtained at [facility] and pending


Came from [place] on 5/7/20. Self quarantined for 4-5 days once arriving in [facility]


Former smoker/vaper/marijuana user - quit 3/2020


Tmax 104 in ED


Blood culture obtained in ED


WBC 12.40-->9.68


Neutrophils elevated at 11.45-->8.42


Lactic acid 1.0





CRP 26.0


Ferritin 502


D-dimmer 0.86-->0.72


Placed on 2L O2 in ED with prior saturations of 86%


Sepsis screen: PNA; Elevated temp, Tachycardia, Leukocytosis, Lactic acid 1.0; 

No signs of organ dysfunction: Negative


Rapid COVID-19 screen here negative


CXR in ED shows mild increased density within the right lung base either due to 

focal bronchitis or early PNA


PLAN


-Airborn Isolation pending repeat COVID-19 test


-O2 as needed


-IS/RT consult


-Albuterol inhaler and Seebri inhaler


-Started on Rocephin in ED - continue at 2 grams


-Add Azithromycin


-PRN dextromethorphan/guaifenesin


-Procalcitonin pending


-Mycoplasma PNA, Sputum culture, Strep Pneumonia pending


-Monitor labs


-IV fluids as ordered 


-Repeat CXR today





S/P Hypokalemia


Potassium 3.1 in ED-->3.9


Likely 2/2 vomiting


Supplemented in ED 


PLAN


-Monitor 





DVT prophylaxis: VTE score of 1 - not indicated 





GI prophylaxis: Not indicated





PCP: None locally





Disposition: Patient will be admitted to the floor for management of his PNA, 

hypoxia and nausea. He had failed outpatient treatment. COVID-19 test from 

[place] is pending. 








- Mortality Measure


Prognosis:: Good

## 2020-05-21 NOTE — CR
Chest: Portable view of the chest was obtained.

 

Comparison: Prior chest x-ray of 05/20/20.

 

Findings: Increased lung markings are seen on both sides of the chest.

  Findings are increasing in prominence from previous exam.  Lungs 

otherwise are clear.  Heart size and mediastinum are normal.  Bony 

structures are unremarkable.

 

Impression:

1.  Increasing lung markings on both sides of the chest presumably due

 to worsening bronchitis.

 

Diagnostic code #3

 

This report was dictated in MDT

## 2020-05-22 RX ADMIN — POTASSIUM CHLORIDE SCH MEQ: 1500 TABLET, EXTENDED RELEASE ORAL at 20:43

## 2020-05-22 RX ADMIN — POTASSIUM CHLORIDE SCH MEQ: 1500 TABLET, EXTENDED RELEASE ORAL at 09:12

## 2020-05-22 RX ADMIN — SODIUM CHLORIDE PRN MG: 9 INJECTION, SOLUTION INTRAVENOUS at 11:04

## 2020-05-22 RX ADMIN — GLYCOPYRROLATE SCH PUFF: 15.6 CAPSULE RESPIRATORY (INHALATION) at 21:22

## 2020-05-22 RX ADMIN — GLYCOPYRROLATE SCH PUFF: 15.6 CAPSULE RESPIRATORY (INHALATION) at 08:12

## 2020-05-22 RX ADMIN — SODIUM CHLORIDE PRN MG: 9 INJECTION, SOLUTION INTRAVENOUS at 18:07

## 2020-05-22 NOTE — PCM.PN
- General Info


Date of Service: 05/22/20


Admission Dx/Problem (Free Text): 


 Admission Diagnosis/Problem





Admission Diagnosis/Problem      Pneumonia








Subjective Update: 


Feeling better today


Fever overnight and this afternoon


Repeat rapid COVID-19 negative


COVID-19 still pending from [place] 


Intake improving


Requiring 2L oxygen





Functional Status: Reports: Pain Controlled, Tolerating Diet, Ambulating (in 

room ), Incentive Spirometry.  Denies: Urinating, New Symptoms





- Review of Systems


General: Reports: Fever (this afternoon and overnight ), Weakness (improved ).  

Denies: Fatigue, Malaise, Chills


HEENT: Reports: No Symptoms.  Denies: Headaches, Sore Throat


Pulmonary: Reports: Shortness of Breath (improved ), Pleuritic Chest Pain, 

Cough (worse today ), Sputum (minimal ), Wheezing


Cardiovascular: Reports: No Symptoms, Dyspnea on Exertion.  Denies: Chest Pain, 

Palpitations, Edema


Gastrointestinal: Reports: No Symptoms.  Denies: Abdominal Pain, Constipation, 

Diarrhea, Nausea, Vomiting


Genitourinary: Reports: No Symptoms.  Denies: Dysuria


Musculoskeletal: Reports: Back Pain (2/2 coughing )


Skin: Reports: No Symptoms.  Denies: Cyanosis


Neurological: Reports: No Symptoms.  Denies: Confusion, Difficulty Walking, 

Gait Disturbance


Psychiatric: Reports: No Symptoms





- Patient Data


Vitals - Most Recent: 


 Last Vital Signs











Temp  98.4 F   05/22/20 08:10


 


Pulse  83   05/22/20 08:10


 


Resp  20   05/22/20 08:10


 


BP  111/59 L  05/22/20 08:10


 


Pulse Ox  96   05/22/20 08:10











Weight - Most Recent: 175 lb 8 oz


I&O - Last 24 Hours: 


 Intake & Output











 05/21/20 05/22/20 05/22/20





 22:59 06:59 14:59


 


Intake Total 1650 968 


 


Output Total 900 700 


 


Balance 750 268 











Lab Results Last 24 Hours: 


 Laboratory Results - last 24 hr











  05/20/20 05/21/20 05/21/20 Range/Units





  20:35 05:09 18:20 


 


WBC     (4.23-9.07)  K/mm3


 


RBC     (4.63-6.08)  M/mm3


 


Hgb     (13.7-17.5)  gm/dl


 


Hct     (40.1-51.0)  %


 


MCV     (79.0-92.2)  fl


 


MCH     (25.7-32.2)  pg


 


MCHC     (32.2-35.5)  g/dl


 


RDW Std Deviation     (35.1-43.9)  fL


 


Plt Count     (163-337)  K/mm3


 


MPV     (9.4-12.3)  fl


 


Neut % (Auto)     (34.0-67.9)  %


 


Lymph % (Auto)     (21.8-53.1)  %


 


Mono % (Auto)     (5.3-12.2)  %


 


Eos % (Auto)     (0.8-7.0)  


 


Baso % (Auto)     (0.1-1.2)  %


 


Neut # (Auto)     (1.78-5.38)  K/mm3


 


Lymph # (Auto)     (1.32-3.57)  K/mm3


 


Mono # (Auto)     (0.30-0.82)  K/mm3


 


Eos # (Auto)     (0.04-0.54)  K/mm3


 


Baso # (Auto)     (0.01-0.08)  K/mm3


 


Manual Slide Review     


 


Sodium     (136-145)  mEq/L


 


Potassium     (3.5-5.1)  mEq/L


 


Chloride     ()  mEq/L


 


Carbon Dioxide     (21-32)  mEq/L


 


Anion Gap     (5-15)  


 


BUN     (7-18)  mg/dL


 


Creatinine     (0.7-1.3)  mg/dL


 


Est Cr Clr Drug Dosing     mL/min


 


Estimated GFR (MDRD)     (>60)  mL/min


 


BUN/Creatinine Ratio     (14-18)  


 


Glucose     ()  mg/dL


 


Calcium     (8.5-10.1)  mg/dL


 


Magnesium     (1.8-2.4)  mg/dl


 


C-Reactive Protein     (<1.0)  mg/dL


 


Procalcitonin  0.82 H    (<0.10)  ng/mL


 


Mycoplasma pneumon IgM   Negative   (NEGATIVE)  


 


SARS Virus RNA (PCR)    Negative  (NEGATIVE)  














  05/22/20 05/22/20 Range/Units





  05:42 05:42 


 


WBC  9.99 H   (4.23-9.07)  K/mm3


 


RBC  4.44 L   (4.63-6.08)  M/mm3


 


Hgb  12.3 L   (13.7-17.5)  gm/dl


 


Hct  37.6 L   (40.1-51.0)  %


 


MCV  84.7   (79.0-92.2)  fl


 


MCH  27.7   (25.7-32.2)  pg


 


MCHC  32.7   (32.2-35.5)  g/dl


 


RDW Std Deviation  38.0   (35.1-43.9)  fL


 


Plt Count  244   (163-337)  K/mm3


 


MPV  11.6   (9.4-12.3)  fl


 


Neut % (Auto)  91.6 H   (34.0-67.9)  %


 


Lymph % (Auto)  5.7 L   (21.8-53.1)  %


 


Mono % (Auto)  1.6 L   (5.3-12.2)  %


 


Eos % (Auto)  0.6 L   (0.8-7.0)  


 


Baso % (Auto)  0.1   (0.1-1.2)  %


 


Neut # (Auto)  9.15 H   (1.78-5.38)  K/mm3


 


Lymph # (Auto)  0.57 L   (1.32-3.57)  K/mm3


 


Mono # (Auto)  0.16 L   (0.30-0.82)  K/mm3


 


Eos # (Auto)  0.06   (0.04-0.54)  K/mm3


 


Baso # (Auto)  0.01   (0.01-0.08)  K/mm3


 


Manual Slide Review  Abnormal smear   


 


Sodium   138  (136-145)  mEq/L


 


Potassium   3.4 L  (3.5-5.1)  mEq/L


 


Chloride   101  ()  mEq/L


 


Carbon Dioxide   28  (21-32)  mEq/L


 


Anion Gap   12.4  (5-15)  


 


BUN   12  (7-18)  mg/dL


 


Creatinine   1.1  (0.7-1.3)  mg/dL


 


Est Cr Clr Drug Dosing   119.61  mL/min


 


Estimated GFR (MDRD)   > 60  (>60)  mL/min


 


BUN/Creatinine Ratio   10.9 L  (14-18)  


 


Glucose   114 H  ()  mg/dL


 


Calcium   8.8  (8.5-10.1)  mg/dL


 


Magnesium   2.1  (1.8-2.4)  mg/dl


 


C-Reactive Protein   24.8 H*  (<1.0)  mg/dL


 


Procalcitonin    (<0.10)  ng/mL


 


Mycoplasma pneumon IgM    (NEGATIVE)  


 


SARS Virus RNA (PCR)    (NEGATIVE)  











Darin Results Last 24 Hours: 


 Microbiology











 05/20/20 20:45 Aerobic Blood Culture - Preliminary





 Blood - Venous    NO GROWTH AFTER 1 DAY





 Anaerobic Blood Culture - Preliminary





    NO GROWTH AFTER 1 DAY


 


 05/20/20 20:35 Aerobic Blood Culture - Preliminary





 Blood - Venous - Lab Draw    NO GROWTH AFTER 1 DAY





 Anaerobic Blood Culture - Preliminary





    NO GROWTH AFTER 1 DAY


 


 05/21/20 18:20 Influenza Type A Antigen Screen - Final





 Nasal, Unspecified    NEGATIVE INFLUENZA A VIRUS AG





    REFERENCE RANGE: NEGATIVE





 Influenza Type B Antigen Screen - Final





    NEGATIVE INFLUENZA B VIRUS AG





    REFERENCE RANGE: NEGATIVE











Med Orders - Current: 


 Current Medications





Acetaminophen (Tylenol)  650 mg PO Q4H PRN


   PRN Reason: Pain/Fever


   Last Admin: 05/22/20 03:57 Dose:  650 mg


Albuterol (Proventil Hfa)  0 gm INH Q2H PRN


   PRN Reason: Shortness Of Breath/wheezing


   Last Admin: 05/21/20 09:11 Dose:  2 puff


Albuterol (Proventil Hfa)  0 gm INH Q6H [facility]


   Last Admin: 05/22/20 08:13 Dose:  2 puff


Cyclobenzaprine HCl (Flexeril)  10 mg PO TID PRN


   PRN Reason: Muscle Spasm


   Last Admin: 05/22/20 03:57 Dose:  10 mg


Glycopyrrolate (Seebri Neohaler)  15.6 mcg IH BID [facility]


   Last Admin: 05/22/20 08:12 Dose:  1 puff


Guaifenesin/Phenylephrine HCl (Robitussin Dm)  10 ml PO TID@0700,1400,2100 PRN


   PRN Reason: Cough


Lactated Ringer's (Ringers, Lactated)  1,000 mls @ 50 mls/hr IV ASDIRECTED [facility]


   Last Admin: 05/21/20 15:15 Dose:  50 mls/hr


Ceftriaxone Sodium 2 gm/ (Sodium Chloride)  100 mls @ 200 mls/hr IV Q24H [facility]


   Last Admin: 05/21/20 08:16 Dose:  200 mls/hr


Azithromycin 500 mg/ Sodium (Chloride)  250 mls @ 250 mls/hr IV Q24H [facility]


   Last Admin: 05/21/20 09:27 Dose:  250 mls/hr


Metoclopramide HCl (Reglan)  5 mg IVPUSH Q6H PRN


   PRN Reason: Nausea


   Last Admin: 05/22/20 03:56 Dose:  5 mg


Miscellaneous Information (Remove Patch)  0 ea TRDERM ONETIME ONE


   Stop: 05/24/20 15:01


Ondansetron HCl (Zofran)  4 mg IVPUSH Q6HR PRN


   PRN Reason: Nausea


   Last Admin: 05/21/20 15:08 Dose:  4 mg


Potassium Chloride (Klor-Con M20)  40 meq PO BID [facility]


   Stop: 05/23/20 09:01


Sodium Chloride (Saline Flush)  10 ml FLUSH ASDIRECTED PRN


   PRN Reason: Keep Vein Open


   Last Admin: 05/20/20 20:42 Dose:  10 ml





Discontinued Medications





Albuterol (Proventil Hfa)  0 gm INH Q6H [facility]


   Last Admin: 05/21/20 10:01 Dose:  Not Given


Albuterol/Ipratropium (Duoneb 3.0-0.5 Mg/3 Ml)  3 ml NEB Q6HRRT [facility]


Ceftriaxone Sodium 1 gm/ (Sodium Chloride)  100 mls @ 200 mls/hr IV ONETIME ONE


   Stop: 05/20/20 23:02


   Last Admin: 05/20/20 22:51 Dose:  200 mls/hr


Lactated Ringer's (Ringers, Lactated)  1,000 mls @ 100 mls/hr IV ASDIRECTED [facility]


   Last Admin: 05/20/20 22:52 Dose:  100 mls/hr


Potassium Chloride 10 meq/ (Premix)  100 mls @ 100 mls/hr IV ASDIRECTED [facility]


   Stop: 05/23/20 23:44


   Last Admin: 05/20/20 22:52 Dose:  100 mls/hr


Potassium Chloride 10 meq/ (Premix)  100 mls @ 100 mls/hr IV Q1H [facility]


   Stop: 05/21/20 02:44


   Last Admin: 05/21/20 02:48 Dose:  100 mls/hr


Scopolamine (Transderm-Scop)  1.5 mg TOP ONETIME ONE


   Stop: 05/21/20 15:01


   Last Admin: 05/21/20 15:08 Dose:  1.5 mg











- Exam


Quality Assessment: DVT Prophylaxis


General: Alert, Oriented, Cooperative, No Acute Distress


HEENT: Pupils Equal, Pupils Reactive, Mucous Membr. Moist/Pink


Neck: Supple, Trachea Midline


Lungs: Normal Respiratory Effort, Decreased Breath Sounds, Crackles


Cardiovascular: Regular Rate, Regular Rhythm


GI/Abdominal Exam: Normal Bowel Sounds, Soft, Non-Tender, No Distention


 (Male) Exam: Deferred


Back Exam: Normal Inspection, Full Range of Motion


Extremities: Normal Inspection, Normal Range of Motion, Non-Tender, No Pedal 

Edema, Normal Capillary Refill


Skin: Warm, Dry, Intact


Neurological: No New Focal Deficit


Psy/Mental Status: Alert, Normal Affect, Normal Mood





Sepsis Event Note





- Evaluation


Sepsis Screening Result: Possible Sepsis Risk





- Focused Exam


Vital Signs: 


 Vital Signs











  Temp Pulse Resp BP Pulse Ox Pulse Ox


 


 05/22/20 08:10  98.4 F  83  20  111/59 L  96 


 


 05/22/20 07:03      93 L 


 


 05/22/20 03:57  100.1 F     


 


 05/22/20 03:52  100.0 F  103 H  20  112/59 L  92 L 


 


 05/22/20 03:36       94 L


 


 05/21/20 23:33  99.0 F  76  20   98 











Date Exam was Performed: 05/22/20


Time Exam was Performed: 13:29





- Problem List & Annotations


(1) Former smoker


SNOMED Code(s): 5350564


   Code(s): Z87.891 - PERSONAL HISTORY OF NICOTINE DEPENDENCE   Status: Chronic

   Priority: Medium   Current Visit: Yes   





(2) Fever


SNOMED Code(s): 610927715


   Code(s): R50.9 - FEVER, UNSPECIFIED   Status: Acute   Priority: High   

Current Visit: Yes   


Qualifiers: 


   Fever type: due to other condition   Qualified Code(s): R50.81 - Fever 

presenting with conditions classified elsewhere   





(3) Nausea & vomiting


SNOMED Code(s): 09882967


   Code(s): R11.2 - NAUSEA WITH VOMITING, UNSPECIFIED   Status: Acute   Priority

: High   Current Visit: Yes   


Qualifiers: 


   Vomiting type: unspecified   Vomiting Intractability: unspecified   

Qualified Code(s): R11.2 - Nausea with vomiting, unspecified   





(4) Hypokalemia


SNOMED Code(s): 78178727


   Code(s): E87.6 - HYPOKALEMIA   Status: Acute   Priority: High   Current Visit

: Yes   





(5) Pneumonia


SNOMED Code(s): 119603654


   Code(s): J18.9 - PNEUMONIA, UNSPECIFIED ORGANISM   Status: Acute   Priority: 

High   Current Visit: Yes   


Qualifiers: 


   Pneumonia type: due to unspecified organism   Laterality: right   Lung 

location: lower lobe of lung   Qualified Code(s): J18.9 - Pneumonia, 

unspecified organism   





(6) Hypoxia


SNOMED Code(s): 200755349


   Code(s): R09.02 - HYPOXEMIA   Status: Acute   Priority: High   Current Visit

: Yes   





(7) History of recent travel


SNOMED Code(s): 354562115


   Code(s): Z78.9 - OTHER SPECIFIED HEALTH STATUS   Status: Acute   Priority: 

High   Current Visit: Yes   





- Problem List Review


Problem List Initiated/Reviewed/Updated: Yes





- My Orders


Last 24 Hours: 


My Active Orders





05/21/20 08:32


Isolation [COMM] Routine 





05/21/20 08:35


Albuterol [Proventil HFA]   See Dose Instructions  INH Q2H PRN 





05/21/20 09:00


Albuterol [Proventil HFA]   See Dose Instructions  INH Q6H 


Azithromycin [Zithromax] 500 mg   Sodium Chloride 0.9% [Normal Saline] 250 ml 

IV Q24H 


Glycopyrrolate [Seebri Neohaler]   15.6 mcg IH BID 





05/21/20 12:00


STREP PNEUMONIAE ANTIGEN [MREF] Routine 





05/21/20 14:00


Dextromethorphan/guaiFENesin [Robitussin DM]   10 ml PO TID@0700,1400,2100 PRN 





05/22/20 09:00


Potassium Chloride [Klor-Con M20]   40 meq PO BID 





05/23/20 05:11


BASIC METABOLIC PANEL,BMP [CHEM] AM 


CBC WITH AUTO DIFF [HEME] AM 


CRP [C-REACTIVE PROTEIN] [CHEM] AM 


MAGNESIUM [CHEM] AM 





05/24/20 05:11


BASIC METABOLIC PANEL,BMP [CHEM] AM 


CBC WITH AUTO DIFF [HEME] AM 


CRP [C-REACTIVE PROTEIN] [CHEM] AM 


MAGNESIUM [CHEM] AM 





05/24/20 15:00


Remove Patch   0 ea TRDERM ONETIME ONE 





05/25/20 05:11


BASIC METABOLIC PANEL,BMP [CHEM] AM 


CBC WITH AUTO DIFF [HEME] AM 


CRP [C-REACTIVE PROTEIN] [CHEM] AM 


MAGNESIUM [CHEM] AM 














- Plan


Plan:: 


Pneumonia


Hypoxia


Fever   


Nausea & vomiting


Former smoker


History of recent travel


Reports one week history of fever, chills, body aches, cough, nausea, and 

vomiting


Seen at [facility] on 5/11/20 and prescribed amoxicillin, azithromycin, and 

zofran. Unable to keep down due to vomiting.


COVID-19 screen obtained at [facility] and still pending


Came from [place] on 5/7/20. Self quarantined for 4-5 days once arriving in state


Former smoker/vaper/marijuana user - quit 3/2020


Tmax 104 in ED


Blood culture obtained in ED


WBC 12.40-->9.68-->9.99


Neutrophils elevated at 11.45-->8.42


Lactic acid 1.0





CRP 26.0


Ferritin 502


D-dimmer 0.86-->0.72


Placed on 2L O2 in ED with prior saturations of 86%


Sepsis screen: PNA; Elevated temp, Tachycardia, Leukocytosis, Lactic acid 1.0; 

No signs of organ dysfunction: Negative


Rapid COVID-19 screen x2 here negative


CXR in ED shows mild increased density within the right lung base either due to 

focal bronchitis or early PNA


Repeat CXR shows worsening lung markings presumably worsening bronchitis


Mono screen negative


Procalcitonin 0.82 on admission


PLAN


-Airborn Isolation pending repeat COVID-19 test


-O2 as needed


-IS/RT consult


-Albuterol inhaler and Seebri inhaler


-Started on Rocephin in ED - continue at 2 grams


-Add Azithromycin


-PRN dextromethorphan/guaifenesin


-Repeat Procalcitonin tomorrow


-Mycoplasma PNA negative


-Strep Pneumonia pending


-Unable to produce sputum culture 


-Monitor labs


-IV fluids as ordered 


-Repeat CXR tomorrow 


-Viral panel ordered 





Hypokalemia


Potassium 3.1 in ED-->3.9-->3.4


Likely 2/2 vomiting


Supplemented in ED 


PLAN


-Supplement


-Monitor 





DVT prophylaxis: VTE score of 1 - not indicated 





GI prophylaxis: Not indicated





PCP: None locally - needs to establish





Disposition: Patient will be admitted to the floor for management of his PNA, 

hypoxia and nausea. He had failed outpatient treatment. COVID-19 test from 

[place] is pending.

## 2020-05-23 RX ADMIN — SODIUM CHLORIDE PRN MG: 9 INJECTION, SOLUTION INTRAVENOUS at 08:17

## 2020-05-23 RX ADMIN — GLYCOPYRROLATE SCH PUFF: 15.6 CAPSULE RESPIRATORY (INHALATION) at 20:37

## 2020-05-23 RX ADMIN — SODIUM CHLORIDE PRN MG: 9 INJECTION, SOLUTION INTRAVENOUS at 16:17

## 2020-05-23 RX ADMIN — GUAIFENESIN AND DEXTROMETHORPHAN PRN ML: 100; 10 SYRUP ORAL at 20:47

## 2020-05-23 RX ADMIN — GLYCOPYRROLATE SCH PUFF: 15.6 CAPSULE RESPIRATORY (INHALATION) at 08:26

## 2020-05-23 RX ADMIN — POTASSIUM CHLORIDE SCH: 1500 TABLET, EXTENDED RELEASE ORAL at 09:10

## 2020-05-23 RX ADMIN — GUAIFENESIN AND DEXTROMETHORPHAN PRN ML: 100; 10 SYRUP ORAL at 00:39

## 2020-05-23 NOTE — PCM.PN
- General Info


Date of Service: 05/23/20


Admission Dx/Problem (Free Text): 


 Admission Diagnosis/Problem





Admission Diagnosis/Problem      Pneumonia








Subjective Update: 





Patient had another fever of 102.9 this morning.  He is also voicing 

frustration with the situation.  Because of his isolation it takes staff longer 

for him to be seen after pressing of call light and that has made him angry.  

Patient also voices anxiety with multiple blood draws.





- Review of Systems


General: Reports: Fever


HEENT: Reports: No Symptoms


Pulmonary: Reports: Cough.  Denies: Shortness of Breath


Cardiovascular: Reports: No Symptoms


Gastrointestinal: Reports: No Symptoms


Musculoskeletal: Reports: No Symptoms





- Patient Data


Vitals - Most Recent: 


 Last Vital Signs











Temp  99.0 F   05/23/20 11:24


 


Pulse  92   05/23/20 11:24


 


Resp  20   05/23/20 11:24


 


BP  125/62   05/23/20 11:24


 


Pulse Ox  96   05/23/20 15:02











Weight - Most Recent: 175 lb 14.4 oz


I&O - Last 24 Hours: 


 Intake & Output











 05/23/20 05/23/20 05/23/20





 06:59 14:59 22:59


 


Intake Total 1561  


 


Output Total 1300  


 


Balance 261  











Imaging Impressions - Last 24 Hours: 


Chest x-ray: Increased perihilar markings most likely relating to bronchitis.  

Findings slightly increased on the right side from prior chest x-ray.





Lab Results Last 24 Hours: 


 Laboratory Results - last 24 hr











  05/22/20 05/23/20 05/23/20 Range/Units





  13:35 00:41 05:21 


 


WBC    11.12 H  (4.23-9.07)  K/mm3


 


RBC    4.53 L  (4.63-6.08)  M/mm3


 


Hgb    12.4 L  (13.7-17.5)  gm/dl


 


Hct    38.2 L  (40.1-51.0)  %


 


MCV    84.3  (79.0-92.2)  fl


 


MCH    27.4  (25.7-32.2)  pg


 


MCHC    32.5  (32.2-35.5)  g/dl


 


RDW Std Deviation    38.9  (35.1-43.9)  fL


 


Plt Count    296  (163-337)  K/mm3


 


MPV    11.3  (9.4-12.3)  fl


 


Neut % (Auto)    86.6 H  (34.0-67.9)  %


 


Lymph % (Auto)    10.0 L  (21.8-53.1)  %


 


Mono % (Auto)    1.8 L  (5.3-12.2)  %


 


Eos % (Auto)    1.1  (0.8-7.0)  


 


Baso % (Auto)    0.1  (0.1-1.2)  %


 


Neut # (Auto)    9.63 H  (1.78-5.38)  K/mm3


 


Lymph # (Auto)    1.11 L  (1.32-3.57)  K/mm3


 


Mono # (Auto)    0.20 L  (0.30-0.82)  K/mm3


 


Eos # (Auto)    0.12  (0.04-0.54)  K/mm3


 


Baso # (Auto)    0.01  (0.01-0.08)  K/mm3


 


Manual Slide Review    Abnormal smear  


 


Sodium     (136-145)  mEq/L


 


Potassium     (3.5-5.1)  mEq/L


 


Chloride     ()  mEq/L


 


Carbon Dioxide     (21-32)  mEq/L


 


Anion Gap     (5-15)  


 


BUN     (7-18)  mg/dL


 


Creatinine     (0.7-1.3)  mg/dL


 


Est Cr Clr Drug Dosing     mL/min


 


Estimated GFR (MDRD)     (>60)  mL/min


 


BUN/Creatinine Ratio     (14-18)  


 


Glucose     ()  mg/dL


 


Calcium     (8.5-10.1)  mg/dL


 


Magnesium     (1.8-2.4)  mg/dl


 


C-Reactive Protein     (<1.0)  mg/dL


 


Urine Color   Yellow   (Yellow)  


 


Urine Appearance   Clear   (Clear)  


 


Urine pH   7.5   (5.0-8.0)  


 


Ur Specific Gravity   1.020   (1.005-1.030)  


 


Urine Protein   1+ H   (Negative)  


 


Urine Glucose (UA)   Negative   (Negative)  


 


Urine Ketones   Negative   (Negative)  


 


Urine Occult Blood   Negative   (Negative)  


 


Urine Nitrite   Negative   (Negative)  


 


Urine Bilirubin   Negative   (Negative)  


 


Urine Urobilinogen   2.0 H   (0.2-1.0)  


 


Ur Leukocyte Esterase   Negative   (Negative)  


 


Urine RBC   0-5   (0-5)  /hpf


 


Urine WBC   Not seen   (0-5)  /hpf


 


Ur Epithelial Cells   Not seen   (0-5)  /hpf


 


Urine Bacteria   Few   (FEW)  /hpf


 


Urine Mucus   Few   (FEW)  /hpf


 


Rheumatoid Factor Scrn     (NEGATIVE)  


 


Adenovirus (PCR)  Not detected    (Not Detected)  


 


B. pertussis DNA (PCR)  Not detected    (Not Detected)  


 


B.parapertussis DNA PCR  Not detected    (Not Detected)  


 


C. pneumoniae DNA (PCR)  Not detected    (Not Detected)  


 


Coronavirus (PCR)  Not detected    (Not Detected)  


 


Human Metapneumovir PCR  Not detected    (Not Detected)  


 


Influenza A (RT-PCR)  Not detected    (Not Detected)  


 


Influenza B (RT-PCR)  Not detected    (Not Detected)  


 


M. pneumoniae (PCR)  Not detected    (Not Detected)  


 


Parainfluen 1,2,3,4 PCR  Not detected    (Not Detected)  


 


RSV (PCR)  Not detected    (Not Detected)  


 


Entero/Rhino (PCR)  Not detected    (Not Detected)  














  05/23/20 05/23/20 Range/Units





  05:21 05:21 


 


WBC    (4.23-9.07)  K/mm3


 


RBC    (4.63-6.08)  M/mm3


 


Hgb    (13.7-17.5)  gm/dl


 


Hct    (40.1-51.0)  %


 


MCV    (79.0-92.2)  fl


 


MCH    (25.7-32.2)  pg


 


MCHC    (32.2-35.5)  g/dl


 


RDW Std Deviation    (35.1-43.9)  fL


 


Plt Count    (163-337)  K/mm3


 


MPV    (9.4-12.3)  fl


 


Neut % (Auto)    (34.0-67.9)  %


 


Lymph % (Auto)    (21.8-53.1)  %


 


Mono % (Auto)    (5.3-12.2)  %


 


Eos % (Auto)    (0.8-7.0)  


 


Baso % (Auto)    (0.1-1.2)  %


 


Neut # (Auto)    (1.78-5.38)  K/mm3


 


Lymph # (Auto)    (1.32-3.57)  K/mm3


 


Mono # (Auto)    (0.30-0.82)  K/mm3


 


Eos # (Auto)    (0.04-0.54)  K/mm3


 


Baso # (Auto)    (0.01-0.08)  K/mm3


 


Manual Slide Review    


 


Sodium  138   (136-145)  mEq/L


 


Potassium  4.5   (3.5-5.1)  mEq/L


 


Chloride  101   ()  mEq/L


 


Carbon Dioxide  28   (21-32)  mEq/L


 


Anion Gap  13.5   (5-15)  


 


BUN  11   (7-18)  mg/dL


 


Creatinine  1.0   (0.7-1.3)  mg/dL


 


Est Cr Clr Drug Dosing  131.87   mL/min


 


Estimated GFR (MDRD)  > 60   (>60)  mL/min


 


BUN/Creatinine Ratio  11.0 L   (14-18)  


 


Glucose  104   ()  mg/dL


 


Calcium  9.1   (8.5-10.1)  mg/dL


 


Magnesium  2.0   (1.8-2.4)  mg/dl


 


C-Reactive Protein  25.7 H*   (<1.0)  mg/dL


 


Urine Color    (Yellow)  


 


Urine Appearance    (Clear)  


 


Urine pH    (5.0-8.0)  


 


Ur Specific Gravity    (1.005-1.030)  


 


Urine Protein    (Negative)  


 


Urine Glucose (UA)    (Negative)  


 


Urine Ketones    (Negative)  


 


Urine Occult Blood    (Negative)  


 


Urine Nitrite    (Negative)  


 


Urine Bilirubin    (Negative)  


 


Urine Urobilinogen    (0.2-1.0)  


 


Ur Leukocyte Esterase    (Negative)  


 


Urine RBC    (0-5)  /hpf


 


Urine WBC    (0-5)  /hpf


 


Ur Epithelial Cells    (0-5)  /hpf


 


Urine Bacteria    (FEW)  /hpf


 


Urine Mucus    (FEW)  /hpf


 


Rheumatoid Factor Scrn   Negative  (NEGATIVE)  


 


Adenovirus (PCR)    (Not Detected)  


 


B. pertussis DNA (PCR)    (Not Detected)  


 


B.parapertussis DNA PCR    (Not Detected)  


 


C. pneumoniae DNA (PCR)    (Not Detected)  


 


Coronavirus (PCR)    (Not Detected)  


 


Human Metapneumovir PCR    (Not Detected)  


 


Influenza A (RT-PCR)    (Not Detected)  


 


Influenza B (RT-PCR)    (Not Detected)  


 


M. pneumoniae (PCR)    (Not Detected)  


 


Parainfluen 1,2,3,4 PCR    (Not Detected)  


 


RSV (PCR)    (Not Detected)  


 


Entero/Rhino (PCR)    (Not Detected)  











Darin Results Last 24 Hours: 


 Microbiology











 05/20/20 20:45 Aerobic Blood Culture - Preliminary





 Blood - Venous    NO GROWTH AFTER 2 DAYS





 Anaerobic Blood Culture - Preliminary





    NO GROWTH AFTER 2 DAYS


 


 05/20/20 20:35 Aerobic Blood Culture - Preliminary





 Blood - Venous - Lab Draw    NO GROWTH AFTER 2 DAYS





 Anaerobic Blood Culture - Preliminary





    NO GROWTH AFTER 2 DAYS


 


 05/21/20 12:00 Streptococcus pneumoniae Antigen (M - Final





 Urine 











Med Orders - Current: 


 Current Medications





Acetaminophen (Tylenol)  650 mg PO Q4H PRN


   PRN Reason: Pain/Fever


   Last Admin: 05/23/20 08:22 Dose:  650 mg


Albuterol (Proventil Hfa)  0 gm INH Q2H PRN


   PRN Reason: Shortness Of Breath/wheezing


   Last Admin: 05/21/20 09:11 Dose:  2 puff


Albuterol (Proventil Hfa)  0 gm INH Q6H ELSY


   Last Admin: 05/23/20 15:01 Dose:  2 puff


Cyclobenzaprine HCl (Flexeril)  10 mg PO TID PRN


   PRN Reason: Muscle Spasm


   Last Admin: 05/23/20 15:00 Dose:  10 mg


Famotidine (Pepcid)  20 mg PO BID PRN


   PRN Reason: Heartburn


   Last Admin: 05/22/20 20:43 Dose:  20 mg


Glycopyrrolate (Seebri Neohaler)  15.6 mcg IH BID [facility]


   Last Admin: 05/23/20 08:26 Dose:  1 puff


Guaifenesin/Phenylephrine HCl (Robitussin Dm)  10 ml PO TID@0700,1400,2100 PRN


   PRN Reason: Cough


   Last Admin: 05/23/20 00:39 Dose:  10 ml


Lactated Ringer's (Ringers, Lactated)  1,000 mls @ 100 mls/hr IV ASDIRECTED [facility]


   Last Admin: 05/23/20 07:41 Dose:  100 mls/hr


Levofloxacin (Levaquin)  750 mg PO Q24H [facility]


   Last Admin: 05/23/20 13:03 Dose:  750 mg


Metoclopramide HCl (Reglan)  5 mg IVPUSH Q6H PRN


   PRN Reason: Nausea


   Last Admin: 05/23/20 14:58 Dose:  5 mg


Miscellaneous Information (Remove Patch)  0 ea TRDERM ONETIME ONE


   Stop: 05/24/20 15:01


Ondansetron HCl (Zofran)  4 mg IVPUSH Q6HR PRN


   PRN Reason: Nausea


   Last Admin: 05/23/20 08:17 Dose:  4 mg


Sodium Chloride (Saline Flush)  10 ml FLUSH ASDIRECTED PRN


   PRN Reason: Keep Vein Open


   Last Admin: 05/20/20 20:42 Dose:  10 ml





Discontinued Medications





Albuterol (Proventil Hfa)  0 gm INH Q6H [facility]


   Last Admin: 05/21/20 10:01 Dose:  Not Given


Albuterol/Ipratropium (Duoneb 3.0-0.5 Mg/3 Ml)  3 ml NEB Q6HRRT [facility]


Ceftriaxone Sodium 1 gm/ (Sodium Chloride)  100 mls @ 200 mls/hr IV ONETIME ONE


   Stop: 05/20/20 23:02


   Last Admin: 05/20/20 22:51 Dose:  200 mls/hr


Lactated Ringer's (Ringers, Lactated)  1,000 mls @ 100 mls/hr IV ASDIRECTED [facility]


   Last Admin: 05/20/20 22:52 Dose:  100 mls/hr


Potassium Chloride 10 meq/ (Premix)  100 mls @ 100 mls/hr IV ASDIRECTED [facility]


   Stop: 05/23/20 23:44


   Last Admin: 05/20/20 22:52 Dose:  100 mls/hr


Lactated Ringer's (Ringers, Lactated)  1,000 mls @ 50 mls/hr IV ASDIRECTED [facility]


   Last Admin: 05/22/20 09:37 Dose:  50 mls/hr


Potassium Chloride 10 meq/ (Premix)  100 mls @ 100 mls/hr IV Q1H [facility]


   Stop: 05/21/20 02:44


   Last Admin: 05/21/20 02:48 Dose:  100 mls/hr


Ceftriaxone Sodium 2 gm/ (Sodium Chloride)  100 mls @ 200 mls/hr IV Q24H [facility]


   Last Admin: 05/23/20 08:12 Dose:  200 mls/hr


Azithromycin 500 mg/ Sodium (Chloride)  250 mls @ 250 mls/hr IV Q24H [facility]


   Last Admin: 05/23/20 08:52 Dose:  250 mls/hr


Lactated Ringer's (Ringers, Lactated)  1,000 mls @ 1,000 mls/hr IV .BOLUS ONE


   Stop: 05/23/20 10:28


   Last Admin: 05/23/20 10:02 Dose:  1,000 mls/hr


Levofloxacin (Levaquin)  750 mg PO Q24H [facility]


   Last Admin: 05/23/20 14:13 Dose:  Not Given


Potassium Chloride (Klor-Con M20)  40 meq PO BID [facility]


   Stop: 05/23/20 09:01


   Last Admin: 05/23/20 09:10 Dose:  Not Given


Scopolamine (Transderm-Scop)  1.5 mg TOP ONETIME ONE


   Stop: 05/21/20 15:01


   Last Admin: 05/21/20 15:08 Dose:  1.5 mg











- Exam


General: Alert, Oriented


HEENT: Pupils Equal, Mucous Membr. Moist/Pink


Neck: Supple


Lungs: Clear to Auscultation, Normal Respiratory Effort


Cardiovascular: Regular Rate, Regular Rhythm


GI/Abdominal Exam: Normal Bowel Sounds, Soft, Non-Tender, No Organomegaly, No 

Distention, No Abnormal Bruit, No Mass


Extremities: Normal Inspection, Normal Range of Motion, Non-Tender, No Pedal 

Edema, Normal Capillary Refill


Peripheral Pulses: 2+: Posterior Tibial (L), Posterior Tibial (R), Dorsalis 

Pedis (L), Dorsalis Pedis (R)


Skin: Warm, Dry, Intact


Psy/Mental Status: Alert, Agitated





Sepsis Event Note





- Evaluation


Sepsis Screening Result: Sepsis Risk





- Focused Exam


Vital Signs: 


 Vital Signs











  Temp Pulse Resp BP Pulse Ox Pulse Ox


 


 05/23/20 15:02       96


 


 05/23/20 11:24  99.0 F  92  20  125/62  94 L 


 


 05/23/20 08:54  99.9 F     


 


 05/23/20 08:27       93 L


 


 05/23/20 08:22  101.3 F H     


 


 05/23/20 08:20  101.3 F H     


 


 05/23/20 07:47  102.9 F H  101 H    94 L 


 


 05/23/20 07:46  101.1 F H  98  20  123/65  94 L 


 


 05/23/20 07:00      95 


 


 05/23/20 05:22  98.6 F  87  20  115/75  95 











Date Exam was Performed: 05/23/20


Time Exam was Performed: 15:48





- Problem List Review


Problem List Initiated/Reviewed/Updated: Yes





- My Orders


Last 24 Hours: 


My Active Orders





05/22/20 20:21


Famotidine [Pepcid]   20 mg PO BID PRN 





05/22/20 21:00


Lactated Ringers [Ringers, Lactated] 1,000 ml IV ASDIRECTED 





05/23/20 11:08


CMV ABS IGG/IGM [REF] Routine 





05/23/20 11:09


ASHLEY W/REFLEX [REF] Routine 





05/23/20 13:00


levoFLOXacin [Levaquin]   750 mg PO Q24H 














- Plan


Plan:: 


Pneumonia


Hypoxia


Fever   


Nausea & vomiting


Former smoker


History of recent travel


Reports one week history of fever, chills, body aches, cough, nausea, and 

vomiting


Seen at [facility] on 5/11/20 and prescribed amoxicillin, azithromycin, and 

zofran. Unable to keep down due to vomiting.


COVID-19 screen obtained at [facility] and still pending


Came from [place] on 5/7/20. Self quarantined for 4-5 days once arriving in [facility]


Former smoker/vaper/marijuana user - quit 3/2020


Tmax 104 in ED


Tm last 24 hours 102.9


Blood culture obtained in ED -negative so far


WBC 12.40-->9.68-->9.99-->11.12 (1+ toxic granules)


Neutrophilia without bandemia.


Lactic acid 1.0





CRP 26.0-->25.7


Ferritin 502


D-dimmer 0.86-->0.72


Placed on 2L O2 in ED with prior saturations of 86%


Sepsis screen: PNA; Elevated temp, Tachycardia, Leukocytosis, Lactic acid 1.0; 

No signs of organ dysfunction: Negative


Rapid COVID-19 screen x2 here negative


CXR in ED shows mild increased density within the right lung base either due to 

focal bronchitis or early PNA


Repeat CXR shows worsening lung markings presumably worsening bronchitis


Mono screen negative


Procalcitonin 0.82 on admission


FiO2 down to 1 L via nasal cannula.


Spoke with Dr. Lowe in infectious disease.  He also feels this is likely a 

viral illness and possibly CMV.


Patient continues to complain of nausea.


PLAN


-Airborne Isolation pending repeat COVID-19 test


-O2 as needed


-IS/RT consult


-Albuterol inhaler and Seebri inhaler


-Started on Rocephin in ED -stop


-Stop azithromycin


-Start Levaquin 750 mg p.o. daily


-PRN dextromethorphan/guaifenesin


-Repeat Procalcitonin today


-Mycoplasma PNA negative


-Strep Pneumonia negative


-Unable to produce sputum culture 


-Monitor labs


-IV fluids as ordered 


-Viral panel negative for respiratory viruses or mycoplasma


-CMV and EBV panels


-Viral hepatitis panel


-ASHLEY, rheumatoid factor





Hypokalemia -resolved


Potassium 3.1 in ED-->3.9-->3.4-->4.5


Likely 2/2 vomiting


Supplemented 


PLAN


-Monitor 





DVT prophylaxis: VTE score of 1 - not indicated 





GI prophylaxis: Not indicated





PCP: None locally - needs to establish





Disposition: Patient will be admitted to the floor for management of his PNA, 

hypoxia and nausea. He had failed outpatient treatment. COVID-19 test from 

[place] is pending.

## 2020-05-23 NOTE — CR
Chest: Portable view of the chest was obtained.

 

Comparison: Prior chest x-ray of 05/21/20 and 05/20/20.

 

Increased perihilar markings are noted diffusely within both sides of 

the chest.  Findings appear slightly increased on the right side from 

prior exam.  Finding on the left side appears stable.  No alveolar 

densities are seen.  Bony structures are grossly intact.

 

Impression:

1.  Increased perihilar markings most likely relating to bronchitis.  

Findings slightly increased on the right side from prior chest x-ray.

 

Diagnostic code #3

 

This report was dictated in MDT

## 2020-05-24 RX ADMIN — GLYCOPYRROLATE SCH PUFF: 15.6 CAPSULE RESPIRATORY (INHALATION) at 08:06

## 2020-05-24 NOTE — [id number]
**Discharge Summary





- Hospital Course


HPI Initial Comments: 





Juan Luis Stroud is a 21 yo male who presents to ED on 5/20/2020 with respiratory 

symptoms of cough, shortness of breath, chest pressure, and vomiting.  He 

reports fever, chills, body aches, cough, nausea, and vomiting for the past 

week.  Symptoms started on Tuesday, 9/12/2020 with fever and body aches.  These 

progressed to dry cough, nausea, and vomiting around 5/16/2020.  He presented 

to the [facility] walk-in on Monday and was started on amoxicillin and 

azithromycin for pneumonia.  He was also given Zofran for nausea and a 

coronavirus screen was obtained.  States that he continues to vomit despite 

Zofran and has been unable to keep any of his antibiotics down.  He states 

prior to coming to the emergency room he developed shortness of breath.  He has 

had intermittent fevers but he is unsure how high they have been as he does not 

have a thermometer at home.  He states he recently quit smoking. He came to 

[place] from [place] on May 7 to work at the [employer].  He reports 

prior to coming in [place] he was quarantined due to the COVID 19 

lockdown.  He reports he was quarantined for another for 5 days once he arrived 

in [place].  He reports his first day of work was the 12th which was 1 day 

prior to developing symptoms.  Denies any prior sick contacts or underlying 

health conditions.





In the ED had a temp of 100.4 and a pulse of 106.  White count was elevated 

along with CRP.  Ferritin was elevated and d-dimer was mildly elevated.  COVID-

19 screen was obtained and was negative.  His potassium was also noted to be 

low.  He was hypoxic and placed on 2 L of oxygen with saturations of 94 to 96%.

  Ex x-rays obtained and shows a mild increased density within the right lung 

base either due to focal bronchitis or early pneumonia.  Etiology could be 

either bacterial or viral.





He does not have a primary care provider locally.  He was subsequently admitted 

to the medical floor for management of his failed outpatient pneumonia and 

hypoxia. He is a former smoker who quit in March of 2020. He states he vaped 

for about a year prior to quiting smoking as he was attempting to wean down. He 

states he also smoked marijuana. 


Diagnosis: Stroke: No





- Discharge Data


Discharge Date: 05/24/20


Discharge Disposition: Home, Self-Care 01


Condition: Good





- Referral to Home Health


Primary Care Physician: 


PCP None








- Discharge Diagnosis/Problem(s)


(1) Bronchitis


SNOMED Code(s): 50071460


   ICD Code: J40 - BRONCHITIS, NOT SPECIFIED AS ACUTE OR CHRONIC   Status: 

Acute   Current Visit: Yes   





(2) Fever


SNOMED Code(s): 386256061


   ICD Code: R50.9 - FEVER, UNSPECIFIED   Status: Acute   Priority: High   

Current Visit: Yes   


Qualifiers: 


   Fever type: due to other condition   Qualified Code(s): R50.81 - Fever 

presenting with conditions classified elsewhere   





(3) Nausea & vomiting


SNOMED Code(s): 70318974


   ICD Code: R11.2 - NAUSEA WITH VOMITING, UNSPECIFIED   Status: Acute   

Priority: High   Current Visit: Yes   


Qualifiers: 


   Vomiting type: unspecified   Vomiting Intractability: unspecified   

Qualified Code(s): R11.2 - Nausea with vomiting, unspecified   





- Patient Summary/Data


Consults: 


 Consultations





05/21/20 07:32


Respiratory Care Assess and Treatment [CONS] Routine 











Hospital Course: 





Pneumonia


Hypoxia


Fever   


Nausea & vomiting


Former smoker


History of recent travel


Reports one week history of fever, chills, body aches, cough, nausea, and 

vomiting


Seen at [facility] on 5/11/20 and prescribed amoxicillin, azithromycin, and 

zofran. Unable to keep down due to vomiting.


COVID-19 screen obtained at [facility]: Negative


Came from [place] on 5/7/20. Self quarantined for 4-5 days once arriving in [facility]


Former smoker/vaper/marijuana user - quit 5/7/2020.  Patient initially said he 

stopped smoking in March.


Tmax 104 in ED


Tm last 24 hours 101.3


Blood culture obtained in ED -negative day3


Started on Rocephin and azithromycin.  Patient continued to have a fever up to 

102.9 so he was switched to Levaquin.


Continue to have a mild white count up to 11.  He refused lab work the day of 

discharge.


Placed on 2L O2 in ED with prior saturations of 86%


Sepsis screen: PNA; Elevated temp, Tachycardia, Leukocytosis, Lactic acid 1.0; 

No signs of organ dysfunction: Negative


Rapid COVID-19 screen x2 here negative.  Total of 3- COVID's


CXR in ED shows mild increased density within the right lung base either due to 

focal bronchitis or early PNA


Repeat CXR shows worsening lung markings presumably worsening bronchitis


Mono screen negative


Procalcitonin 0.82 on admission


Patient was weaned down over 3 days of FiO2 and was off oxygen on day of 

discharge.


Spoke with Dr. Lowe in infectious disease.  He also feels this is likely a 

viral illness and possibly CMV.  Patient refused CMV, EBV, ASHLEY panels.


He struggled with nausea and some vomiting throughout hospitalization.  On day 

of discharge he was asymptomatic.


PLAN


Discharged home in good condition


-Albuterol inhaler and Seebri inhaler


-Started on Rocephin in ED -stop


-Stop azithromycin


-Start Levaquin 750 mg p.o. daily for 5 days


-PRN dextromethorphan/guaifenesin


-Repeat Procalcitonin today


-Mycoplasma PNA negative


-Strep Pneumonia negative


-Unable to produce sputum culture 


-Viral panel negative for respiratory viruses or mycoplasma


-CMV and EBV panels


-Viral hepatitis panel


-ASHLEY, rheumatoid factor


Patient elected to discharge home.  If he has CMV could be a few weeks before 

his fever resolved.  Levaquin should cover atypicals better.  If he has any 

worsening symptoms he needs to return to the emergency department.





Hypokalemia -resolved


Potassium 3.1 in ED-->3.9-->3.4-->4.5


Likely 2/2 vomiting


Supplemented 





- Patient Instructions


Diet: Usual Diet as Tolerated


Activity: Apply Ice


Driving: [place] [address] Today


Showering/Bathing: May Shower


Other/Special Instructions: Please follow-up with primary care in 1 week.  If 

you become short of breath, lightheaded, worsening cough, or any other 

worsening symptoms return to the emergency department.





- Discharge Plan


*PRESCRIPTION DRUG MONITORING PROGRAM REVIEWED*: No


*COPY OF PRESCRIPTION DRUG MONITORING REPORT IN PATIENT SYDNEE: No


Prescriptions/Med Rec: 


Albuterol [Proventil HFA] 2 puff INH Q2H PRN #1 inhaler


 PRN Reason: Shortness Of Breath/wheezing


Cyclobenzaprine [Flexeril] 10 mg PO TID PRN #10 tablet


 PRN Reason: Muscle Spasm


levoFLOXacin [Levaquin] 750 mg PO Q24H #4 tablet


Tiotropium [Spiriva HandiHaler] 18 mcg .XX DAILY #30 cap


Home Medications: 


 Home Meds





Albuterol [Proventil HFA] 2 puff INH Q2H PRN #1 inhaler 05/24/20 [Rx]


Cyclobenzaprine [Flexeril] 10 mg PO TID PRN #10 tablet 05/24/20 [Rx]


Dextromethorphan/guaiFENesin [Robitussin DM] 10 ml PO TID@0700,1400,2100 PRN  

cup 05/24/20 [Rx]


Famotidine [Pepcid] 20 mg PO BID PRN  tablet 05/24/20 [Rx]


Tiotropium [Spiriva HandiHaler] 18 mcg .XX DAILY #30 cap 05/24/20 [Rx]


levoFLOXacin [Levaquin] 750 mg PO Q24H #4 tablet 05/24/20 [Rx]








Oxygen Therapy Mode: Room Air


Patient Handouts:  Sepsis, Diagnosis, Adult, Community-Acquired Pneumonia, Adult

, Steps to Help Prevent the Spread of COVID-19 if You Are Sick - [facility] [place]


Forms:  ED Department Discharge


Referrals: 


PCP,None [Primary Care Provider] - 





- Discharge Summary/Plan Comment


DC Time >30 min.: Yes


Discharge Summary/Plan Comment: 





Discharged home in stable condition.





- General Info


Date of Service: 05/24/20


Admission Dx/Problem (Free Text: 


 Admission Diagnosis/Problem





Admission Diagnosis/Problem      Pneumonia








Subjective Update: 





Patient states he is feeling much better.  He denies any nausea or vomiting 

today.


Functional Status: Reports: Pain Controlled





- Review of Systems


General: Reports: No Symptoms


HEENT: Reports: No Symptoms


Pulmonary: Reports: Cough


Cardiovascular: Reports: No Symptoms


Gastrointestinal: Reports: No Symptoms


Musculoskeletal: Reports: No Symptoms





- Patient Data


Vitals - Most Recent: 


 Last Vital Signs











Temp  97.5 F   05/24/20 08:17


 


Pulse  85   05/24/20 08:19


 


Resp  18   05/24/20 08:17


 


BP  116/76   05/24/20 08:17


 


Pulse Ox  96   05/24/20 08:19











Weight - Most Recent: 172 lb 14.4 oz


I&O - Last 24 hours: 


 Intake & Output











 05/23/20 05/24/20 05/24/20





 22:59 06:59 14:59


 


Intake Total 3728 1770 


 


Output Total 900 1850 


 


Balance 2828 -80 











Lab Results - Last 24 hrs: 


 Laboratory Results - last 24 hr











  05/23/20 Range/Units





  05:21 


 


Rheumatoid Factor Scrn  Negative  (NEGATIVE)  











LARA Results - Last 24 hrs: 


 Microbiology











 05/20/20 20:45 Aerobic Blood Culture - Preliminary





 Blood - Venous    NO GROWTH AFTER 3 DAYS





 Anaerobic Blood Culture - Preliminary





    NO GROWTH AFTER 3 DAYS


 


 05/20/20 20:35 Aerobic Blood Culture - Preliminary





 Blood - Venous - Lab Draw    NO GROWTH AFTER 3 DAYS





 Anaerobic Blood Culture - Preliminary





    NO GROWTH AFTER 3 DAYS











Med Orders - Current: 


 Current Medications





Acetaminophen (Tylenol)  650 mg PO Q4H PRN


   PRN Reason: Pain/Fever


   Last Admin: 05/24/20 04:25 Dose:  650 mg


Albuterol (Proventil Hfa)  0 gm INH Q2H PRN


   PRN Reason: Shortness Of Breath/wheezing


   Last Admin: 05/21/20 09:11 Dose:  2 puff


Albuterol (Proventil Hfa)  0 gm INH Q6H [facility]


   Last Admin: 05/24/20 08:06 Dose:  2 puff


Cyclobenzaprine HCl (Flexeril)  10 mg PO TID PRN


   PRN Reason: Muscle Spasm


   Last Admin: 05/23/20 15:00 Dose:  10 mg


Famotidine (Pepcid)  20 mg PO BID PRN


   PRN Reason: Heartburn


   Last Admin: 05/22/20 20:43 Dose:  20 mg


Glycopyrrolate (Seebri Neohaler)  15.6 mcg IH BID [facility]


   Last Admin: 05/24/20 08:06 Dose:  1 puff


Guaifenesin/Phenylephrine HCl (Robitussin Dm)  10 ml PO TID@0700,1400,2100 PRN


   PRN Reason: Cough


   Last Admin: 05/23/20 20:47 Dose:  10 ml


Lactated Ringer's (Ringers, Lactated)  1,000 mls @ 100 mls/hr IV ASDIRECTED [facility]


   Last Admin: 05/24/20 06:14 Dose:  100 mls/hr


Promethazine HCl 12.5 mg/ (Sodium Chloride)  50.5 mls @ 100 mls/hr IV Q6H PRN


   PRN Reason: Nausea/Vomiting


Levofloxacin (Levaquin)  750 mg PO Q24H [facility]


   Last Admin: 05/23/20 13:03 Dose:  750 mg


Miscellaneous Information (Remove Patch)  0 ea TRDERM ONETIME ONE


   Stop: 05/24/20 15:01


Miscellaneous Information (Remove Patch)  1 ea TRDERM DAILY [facility]


   Last Admin: 05/24/20 10:19 Dose:  Not Given


Nicotine (Habitrol)  14 mg TRDERM DAILY [facility]


   Last Admin: 05/24/20 08:24 Dose:  Not Given


Ondansetron HCl (Zofran)  4 mg IVPUSH Q4H PRN


   PRN Reason: NAUSEA/VOMITING


Sodium Chloride (Saline Flush)  10 ml FLUSH ASDIRECTED PRN


   PRN Reason: Keep Vein Open


   Last Admin: 05/20/20 20:42 Dose:  10 ml





Discontinued Medications





Albuterol (Proventil Hfa)  0 gm INH Q6H [facility]


   Last Admin: 05/21/20 10:01 Dose:  Not Given


Albuterol/Ipratropium (Duoneb 3.0-0.5 Mg/3 Ml)  3 ml NEB Q6HRRT [facility]


Ceftriaxone Sodium 1 gm/ (Sodium Chloride)  100 mls @ 200 mls/hr IV ONETIME ONE


   Stop: 05/20/20 23:02


   Last Admin: 05/20/20 22:51 Dose:  200 mls/hr


Lactated Ringer's (Ringers, Lactated)  1,000 mls @ 100 mls/hr IV ASDIRECTED [facility]


   Last Admin: 05/20/20 22:52 Dose:  100 mls/hr


Potassium Chloride 10 meq/ (Premix)  100 mls @ 100 mls/hr IV ASDIRECTED [facility]


   Stop: 05/23/20 23:44


   Last Admin: 05/20/20 22:52 Dose:  100 mls/hr


Lactated Ringer's (Ringers, Lactated)  1,000 mls @ 50 mls/hr IV ASDIRECTED [facility]


   Last Admin: 05/22/20 09:37 Dose:  50 mls/hr


Potassium Chloride 10 meq/ (Premix)  100 mls @ 100 mls/hr IV Q1H [facility]


   Stop: 05/21/20 02:44


   Last Admin: 05/21/20 02:48 Dose:  100 mls/hr


Ceftriaxone Sodium 2 gm/ (Sodium Chloride)  100 mls @ 200 mls/hr IV Q24H [facility]


   Last Admin: 05/23/20 08:12 Dose:  200 mls/hr


Azithromycin 500 mg/ Sodium (Chloride)  250 mls @ 250 mls/hr IV Q24H [facility]


   Last Admin: 05/23/20 08:52 Dose:  250 mls/hr


Lactated Ringer's (Ringers, Lactated)  1,000 mls @ 1,000 mls/hr IV .BOLUS ONE


   Stop: 05/23/20 10:28


   Last Admin: 05/23/20 10:02 Dose:  1,000 mls/hr


Ondansetron HCl 4 mg/ Sodium (Chloride)  52 mls @ 100 mls/hr IV Q4HR PRN


   PRN Reason: Nausea/Vomiting


Levofloxacin (Levaquin)  750 mg PO Q24H [facility]


   Last Admin: 05/23/20 14:13 Dose:  Not Given


Metoclopramide HCl (Reglan)  5 mg IVPUSH Q6H PRN


   PRN Reason: Nausea


   Last Admin: 05/23/20 14:58 Dose:  5 mg


Ondansetron HCl (Zofran)  4 mg IVPUSH Q6HR PRN


   PRN Reason: Nausea


   Last Admin: 05/23/20 16:17 Dose:  4 mg


Potassium Chloride (Klor-Con M20)  40 meq PO BID ELSY


   Stop: 05/23/20 09:01


   Last Admin: 05/23/20 09:10 Dose:  Not Given


Scopolamine (Transderm-Scop)  1.5 mg TOP ONETIME ONE


   Stop: 05/21/20 15:01


   Last Admin: 05/21/20 15:08 Dose:  1.5 mg











- Exam


Quality Assessment: Denies: Supplemental Oxygen


General: Reports: Alert, Oriented


HEENT: Reports: Pupils Equal, Mucous Membr. Moist/Pink


Neck: Reports: Supple


Lungs: Reports: Clear to Auscultation, Normal Respiratory Effort


Cardiovascular: Reports: Regular Rate, Regular Rhythm


GI/Abdominal Exam: Normal Bowel Sounds, Soft, Non-Tender, No Organomegaly, No 

Distention, No Abnormal Bruit, No Mass


Back Exam: Reports: Normal Inspection


Extremities: Normal Inspection, Normal Range of Motion, Non-Tender, No Pedal 

Edema, Normal Capillary Refill


Skin: Reports: Warm, Dry, Intact


Psy/Mental Status: Reports: Alert, Normal Affect, Normal Mood